# Patient Record
Sex: FEMALE | Race: WHITE | NOT HISPANIC OR LATINO
[De-identification: names, ages, dates, MRNs, and addresses within clinical notes are randomized per-mention and may not be internally consistent; named-entity substitution may affect disease eponyms.]

---

## 2017-12-08 ENCOUNTER — TRANSCRIPTION ENCOUNTER (OUTPATIENT)
Age: 62
End: 2017-12-08

## 2018-04-12 ENCOUNTER — TRANSCRIPTION ENCOUNTER (OUTPATIENT)
Age: 63
End: 2018-04-12

## 2018-11-21 ENCOUNTER — TRANSCRIPTION ENCOUNTER (OUTPATIENT)
Age: 63
End: 2018-11-21

## 2019-01-04 ENCOUNTER — TRANSCRIPTION ENCOUNTER (OUTPATIENT)
Age: 64
End: 2019-01-04

## 2020-09-16 ENCOUNTER — TRANSCRIPTION ENCOUNTER (OUTPATIENT)
Age: 65
End: 2020-09-16

## 2020-11-18 ENCOUNTER — TRANSCRIPTION ENCOUNTER (OUTPATIENT)
Age: 65
End: 2020-11-18

## 2021-03-06 ENCOUNTER — TRANSCRIPTION ENCOUNTER (OUTPATIENT)
Age: 66
End: 2021-03-06

## 2022-04-13 PROBLEM — Z00.00 ENCOUNTER FOR PREVENTIVE HEALTH EXAMINATION: Status: ACTIVE | Noted: 2022-04-13

## 2022-04-14 ENCOUNTER — APPOINTMENT (OUTPATIENT)
Dept: ORTHOPEDIC SURGERY | Facility: CLINIC | Age: 67
End: 2022-04-14
Payer: COMMERCIAL

## 2022-04-14 VITALS
HEIGHT: 64 IN | SYSTOLIC BLOOD PRESSURE: 122 MMHG | DIASTOLIC BLOOD PRESSURE: 74 MMHG | HEART RATE: 96 BPM | BODY MASS INDEX: 30.73 KG/M2 | WEIGHT: 180 LBS

## 2022-04-14 DIAGNOSIS — Z78.9 OTHER SPECIFIED HEALTH STATUS: ICD-10-CM

## 2022-04-14 DIAGNOSIS — Z82.3 FAMILY HISTORY OF STROKE: ICD-10-CM

## 2022-04-14 DIAGNOSIS — Z82.49 FAMILY HISTORY OF ISCHEMIC HEART DISEASE AND OTHER DISEASES OF THE CIRCULATORY SYSTEM: ICD-10-CM

## 2022-04-14 DIAGNOSIS — Z87.39 PERSONAL HISTORY OF OTHER DISEASES OF THE MUSCULOSKELETAL SYSTEM AND CONNECTIVE TISSUE: ICD-10-CM

## 2022-04-14 PROCEDURE — 99204 OFFICE O/P NEW MOD 45 MIN: CPT

## 2022-04-14 NOTE — ADDENDUM
[FreeTextEntry1] : Documented by Theo Raza acting as a scribe for Dr. Yair Aguero on 04/14/2022. All medical record entries made by the Scribe were at my, Dr. Yair Aguero, direction and personally dictated by me on 04/14/2022 . I have reviewed the chart and agree that the record accurately reflects my personal performance of the history, physical exam, assessment and plan. I have also personally directed, reviewed, and agreed with the chart.

## 2022-04-14 NOTE — DISCUSSION/SUMMARY
[de-identified] : We talked about the nature of the condition and treatment options. Anticipatory guidance regarding neurogenic claudication was given. A CT scan has been ordered and is medically necessary due to persistent pain, to assess the size of screws required for surgical intervention, and to assist in the decision of a lumbar laminectomy Vs. lumbar laminectomy and fusion. CT scan will help guide treatment plan, possible surgical intervention vs injection therapy with pain management. The patient will follow up in 4 - 6 weeks for a repeat clinical assessment, at this time we will repeat laminectomy Vs. lumbar laminectomy and fusion.\par \par Prior to appointment and during encounter with patient extensive medical records were reviewed including but not limited to, hospital records, out patient records, imaging results, and lab data. During this appointment the patient was examined, diagnoses were discussed and explained in a face to face manner. In addition extensive time was spent reviewing aforementioned diagnostic studies. Counseling including abnormal image results, differential diagnoses, treatment options, risk and benefits, lifestyle changes, current condition, and current medications was performed. Patient's comments, questions, and concerns were address and patient verbalized understanding. Based on this patient's presentation at our office, which is an orthopedic spine surgeon's office, this patient inherently / intrinsically has a risk, however minute, of developing  issues such as Cauda equina syndrome, bowel and bladder changes, or progression of motor or neurological deficits such as paralysis which may be permanent. \par \par We discussed a lumbar fusion with interbody from L2 to L4-L5 with lumbar laminectomies at these levels. She wishes to discuss an isolated lumbar laminectomy with the hopes of treating her neurogenic claudication more so than her back pain. Anatomic models, Xrays, CT scans/MRI’s were utilized to provide a firm understanding of their surgical plan. Patient is aware that surgery is elective in nature. Risks, benefits, alternatives were discussed and all questions, comments and concerns were encouraged and answered to the patient's satisfaction. The statistical probability of improvement was discussed at length as well as post surgical course. Literature from [employer] spine society was provided to the patient regarding the specific type of surgery as well as a 5 page written surgical consent which the patient will need to sign and return to the office prior to surgical date. Consent forms highlight specific complications related to the complex nature of spinal surgery\par  \par Risks of lumbar surgery include: persistent pain, adjacent segment disease (which will require more surgery in future), dural tears, neurologic injury, and wound healing complications\par  \par Benefits of lumbar surgery include Improved neurologic and pain score\par  \par We also discussed with the patient complications of incisions directly related to obesity, diabetes, previous wound complications or post-surgical wound infections, smoking, neuropathy, and chronic anticoagulation. This risk has been specifically discussed and the patient will discuss modifiable risk factors to be optimized prior to surgical management. A multimodality approach of primary care physician, and medicine subspecialist will be utilized to optimize medical risk factors.\par  \par If patient is a smoker, discontinuation of smoking was advised and must be accomplished 6-8 weeks prior to surgery date. Patient was advised that help with quitting smoking is available through [facility] Smoker's Quit Line and phone number/website was provided, or patient can ask assistance from primary care provider. Elective surgery will not be performed unless patient complies with this policy.\par \par Medical comorbidities including but not limited to diabetes, coronary artery disease, renal insufficiency, uncontrolled hypertension, rheumatoid arthritis, auto-immune disorders, COPD/asthma and/or history of radiation, chemotherapy, being on anticoagulants, chronic steroids, immune modulators, have increased statistical chance of leading to increased hospital stay, protracted recovery period, need for acute or subacute rehabilitation post-operative. There can be increased probability of post-surgical and medical complications including but not limited to surgical site infection, need for revision surgery, deep vein thrombosis, pulmonary embolism, exacerbation of COPD/asthma, pneumonia, UTI, urinary retention, and ileus.

## 2022-04-14 NOTE — HISTORY OF PRESENT ILLNESS
[de-identified] : 66 year old F presents for an initial evaluation of lower back pain, she would like to discuss surgery. She also complains of pain radiating into the RLE. She states she can stand .5 to 1 hours before pain sets in. She requires a stool when prepping food. States lower back and leg pain are equivalent. Alleviating factors include sitting and leaning forward.   [Ataxia] : no ataxia [Incontinence] : no incontinence [Loss of Dexterity] : good dexterity [Urinary Ret.] : no urinary retention

## 2022-04-14 NOTE — PHYSICAL EXAM
[Obese] : obese [Poor Appearance] : well-appearing [Acute Distress] : not in acute distress [de-identified] : CONSTITUTIONAL: The patient is a very pleasant individual who is well-nourished and who appears stated age.\par PSYCHIATRIC: The patient is alert and oriented X 3 and in no apparent distress, and participates with orthopedic evaluation well.\par HEAD: Atraumatic and is nonsyndromic in appearance.\par EENT: No visible thyromegaly, EOMI.\par RESPIRATORY: Respiratory rate is regular, not dyspneic on examination.\par LYMPHATICS: There is no inguinal lymphadenopathy\par INTEGUMENTARY: Skin is clean, dry, and intact about the bilateral lower extremities and lumbar spine.\par VASCULAR: There is brisk capillary refill about the bilateral lower extremities.\par NEUROLOGIC: There are no pathologic reflexes. signs and symptoms of neurogenic claudication. Deep tendon reflexes are well maintained at 2+/4 of the bilateral lower extremities and are symmetric.\par MUSCULOSKELETAL: There is no visible muscular atrophy. Manual motor strength is well maintained in the bilateral lower extremities. Range of motion of lumbar spine is well maintained. Negative tension sign and straight leg raise bilaterally. Quad extension, ankle dorsiflexion, EHL, plantar flexion, and ankle eversion are well preserved. Normal secondary orthopaedic exam of bilateral hips, greater trochanteric area, knees and ankles. No pain with internal or external rotation of the bilateral hips. Pain at the superior aspect of the sacrum.  [de-identified] : AP and Lateral views of the lumbar spine taken 04/14/2022 demonstrates multiple levels of lumbar degenerative disc disease worse at L1-L2. \par \par MRI of the lumbar spine taken at [facility] on 04- demonstrates severe spinal stenosis at L3-L4 and L4-L5. Moderate stenosis at L2, mild to moderate stenosis at L5. \par \par MRI of the lumbar spine taken at [facility] on 12- demonstrates  severe spinal stenosis at L3-L4 and L4-L5. Moderate stenosis at L2, mild to moderate stenosis at L5. \par

## 2022-05-12 ENCOUNTER — APPOINTMENT (OUTPATIENT)
Dept: ORTHOPEDIC SURGERY | Facility: CLINIC | Age: 67
End: 2022-05-12
Payer: MEDICARE

## 2022-05-12 ENCOUNTER — NON-APPOINTMENT (OUTPATIENT)
Age: 67
End: 2022-05-12

## 2022-05-12 VITALS
WEIGHT: 180 LBS | HEIGHT: 65 IN | HEART RATE: 108 BPM | BODY MASS INDEX: 29.99 KG/M2 | SYSTOLIC BLOOD PRESSURE: 126 MMHG | DIASTOLIC BLOOD PRESSURE: 82 MMHG

## 2022-05-12 DIAGNOSIS — M48.062 SPINAL STENOSIS, LUMBAR REGION WITH NEUROGENIC CLAUDICATION: ICD-10-CM

## 2022-05-12 DIAGNOSIS — M47.816 SPONDYLOSIS W/OUT MYELOPATHY OR RADICULOPATHY, LUMBAR REGION: ICD-10-CM

## 2022-05-12 PROCEDURE — 99215 OFFICE O/P EST HI 40 MIN: CPT

## 2022-06-01 DIAGNOSIS — Z01.818 ENCOUNTER FOR OTHER PREPROCEDURAL EXAMINATION: ICD-10-CM

## 2022-06-07 ENCOUNTER — OUTPATIENT (OUTPATIENT)
Dept: OUTPATIENT SERVICES | Facility: HOSPITAL | Age: 67
LOS: 1 days | End: 2022-06-07
Payer: MEDICARE

## 2022-06-07 VITALS
SYSTOLIC BLOOD PRESSURE: 132 MMHG | TEMPERATURE: 97 F | HEIGHT: 63 IN | RESPIRATION RATE: 18 BRPM | WEIGHT: 183.42 LBS | DIASTOLIC BLOOD PRESSURE: 75 MMHG | HEART RATE: 90 BPM | OXYGEN SATURATION: 96 %

## 2022-06-07 DIAGNOSIS — M48.062 SPINAL STENOSIS, LUMBAR REGION WITH NEUROGENIC CLAUDICATION: ICD-10-CM

## 2022-06-07 DIAGNOSIS — Z90.49 ACQUIRED ABSENCE OF OTHER SPECIFIED PARTS OF DIGESTIVE TRACT: Chronic | ICD-10-CM

## 2022-06-07 DIAGNOSIS — Z01.818 ENCOUNTER FOR OTHER PREPROCEDURAL EXAMINATION: ICD-10-CM

## 2022-06-07 DIAGNOSIS — Z98.890 OTHER SPECIFIED POSTPROCEDURAL STATES: Chronic | ICD-10-CM

## 2022-06-07 DIAGNOSIS — Z29.9 ENCOUNTER FOR PROPHYLACTIC MEASURES, UNSPECIFIED: ICD-10-CM

## 2022-06-07 DIAGNOSIS — Z98.891 HISTORY OF UTERINE SCAR FROM PREVIOUS SURGERY: Chronic | ICD-10-CM

## 2022-06-07 LAB
A1C WITH ESTIMATED AVERAGE GLUCOSE RESULT: 5.9 % — HIGH (ref 4–5.6)
ALBUMIN SERPL ELPH-MCNC: 4 G/DL — SIGNIFICANT CHANGE UP (ref 3.3–5.2)
ALP SERPL-CCNC: 96 U/L — SIGNIFICANT CHANGE UP (ref 40–120)
ALT FLD-CCNC: 20 U/L — SIGNIFICANT CHANGE UP
ANION GAP SERPL CALC-SCNC: 9 MMOL/L — SIGNIFICANT CHANGE UP (ref 5–17)
APTT BLD: 30.8 SEC — SIGNIFICANT CHANGE UP (ref 27.5–35.5)
AST SERPL-CCNC: 20 U/L — SIGNIFICANT CHANGE UP
BASOPHILS # BLD AUTO: 0.11 K/UL — SIGNIFICANT CHANGE UP (ref 0–0.2)
BASOPHILS NFR BLD AUTO: 1.5 % — SIGNIFICANT CHANGE UP (ref 0–2)
BILIRUB SERPL-MCNC: <0.2 MG/DL — LOW (ref 0.4–2)
BLD GP AB SCN SERPL QL: SIGNIFICANT CHANGE UP
BUN SERPL-MCNC: 11 MG/DL — SIGNIFICANT CHANGE UP (ref 8–20)
CALCIUM SERPL-MCNC: 9 MG/DL — SIGNIFICANT CHANGE UP (ref 8.6–10.2)
CHLORIDE SERPL-SCNC: 101 MMOL/L — SIGNIFICANT CHANGE UP (ref 98–107)
CO2 SERPL-SCNC: 26 MMOL/L — SIGNIFICANT CHANGE UP (ref 22–29)
CREAT SERPL-MCNC: 0.58 MG/DL — SIGNIFICANT CHANGE UP (ref 0.5–1.3)
EGFR: 100 ML/MIN/1.73M2 — SIGNIFICANT CHANGE UP
EOSINOPHIL # BLD AUTO: 0.14 K/UL — SIGNIFICANT CHANGE UP (ref 0–0.5)
EOSINOPHIL NFR BLD AUTO: 1.9 % — SIGNIFICANT CHANGE UP (ref 0–6)
ESTIMATED AVERAGE GLUCOSE: 123 MG/DL — HIGH (ref 68–114)
GLUCOSE SERPL-MCNC: 163 MG/DL — HIGH (ref 70–99)
HCT VFR BLD CALC: 42.6 % — SIGNIFICANT CHANGE UP (ref 34.5–45)
HGB BLD-MCNC: 13.5 G/DL — SIGNIFICANT CHANGE UP (ref 11.5–15.5)
IMM GRANULOCYTES NFR BLD AUTO: 0.3 % — SIGNIFICANT CHANGE UP (ref 0–1.5)
INR BLD: 1.07 RATIO — SIGNIFICANT CHANGE UP (ref 0.88–1.16)
LYMPHOCYTES # BLD AUTO: 1.13 K/UL — SIGNIFICANT CHANGE UP (ref 1–3.3)
LYMPHOCYTES # BLD AUTO: 15 % — SIGNIFICANT CHANGE UP (ref 13–44)
MCHC RBC-ENTMCNC: 31.5 PG — SIGNIFICANT CHANGE UP (ref 27–34)
MCHC RBC-ENTMCNC: 31.7 GM/DL — LOW (ref 32–36)
MCV RBC AUTO: 99.5 FL — SIGNIFICANT CHANGE UP (ref 80–100)
MONOCYTES # BLD AUTO: 0.64 K/UL — SIGNIFICANT CHANGE UP (ref 0–0.9)
MONOCYTES NFR BLD AUTO: 8.5 % — SIGNIFICANT CHANGE UP (ref 2–14)
MRSA PCR RESULT.: SIGNIFICANT CHANGE UP
NEUTROPHILS # BLD AUTO: 5.47 K/UL — SIGNIFICANT CHANGE UP (ref 1.8–7.4)
NEUTROPHILS NFR BLD AUTO: 72.8 % — SIGNIFICANT CHANGE UP (ref 43–77)
PLATELET # BLD AUTO: 284 K/UL — SIGNIFICANT CHANGE UP (ref 150–400)
POTASSIUM SERPL-MCNC: 4.1 MMOL/L — SIGNIFICANT CHANGE UP (ref 3.5–5.3)
POTASSIUM SERPL-SCNC: 4.1 MMOL/L — SIGNIFICANT CHANGE UP (ref 3.5–5.3)
PROT SERPL-MCNC: 6.7 G/DL — SIGNIFICANT CHANGE UP (ref 6.6–8.7)
PROTHROM AB SERPL-ACNC: 12.4 SEC — SIGNIFICANT CHANGE UP (ref 10.5–13.4)
RBC # BLD: 4.28 M/UL — SIGNIFICANT CHANGE UP (ref 3.8–5.2)
RBC # FLD: 13.3 % — SIGNIFICANT CHANGE UP (ref 10.3–14.5)
S AUREUS DNA NOSE QL NAA+PROBE: SIGNIFICANT CHANGE UP
SODIUM SERPL-SCNC: 136 MMOL/L — SIGNIFICANT CHANGE UP (ref 135–145)
WBC # BLD: 7.51 K/UL — SIGNIFICANT CHANGE UP (ref 3.8–10.5)
WBC # FLD AUTO: 7.51 K/UL — SIGNIFICANT CHANGE UP (ref 3.8–10.5)

## 2022-06-07 PROCEDURE — 71046 X-RAY EXAM CHEST 2 VIEWS: CPT | Mod: 26

## 2022-06-07 PROCEDURE — 71046 X-RAY EXAM CHEST 2 VIEWS: CPT

## 2022-06-07 PROCEDURE — G0463: CPT

## 2022-06-07 NOTE — H&P PST ADULT - NSICDXPASTMEDICALHX_GEN_ALL_CORE_FT
PAST MEDICAL HISTORY:  Spinal stenosis, lumbar region, with neurogenic claudication     Spondylosis without myelopathy or radiculopathy, lumbar region      PAST MEDICAL HISTORY:  Hyperlipidemia     Spinal stenosis, lumbar region, with neurogenic claudication     Spondylosis without myelopathy or radiculopathy, lumbar region

## 2022-06-07 NOTE — H&P PST ADULT - NSANTHOSAYNRD_GEN_A_CORE
No. THEO screening performed.  STOP BANG Legend: 0-2 = LOW Risk; 3-4 = INTERMEDIATE Risk; 5-8 = HIGH Risk

## 2022-06-07 NOTE — H&P PST ADULT - ATTENDING COMMENTS
Attending statement:  I have personally seen this patient, and formed a face to face diagnostic evaluation on this patient on this date.  I have reviewed the PA, NP and or Medical/PA student and/or Resident documentation and agree with the history, physical exam and plan of care except if noted otherwise.      Patient presents for a lumbar laminectomy and instrumented fusion we have discussed the case with her in detail on Sarah 10 patient wishes to undergo sort of a bimodal surgery for back pain and neurogenic claudication that will include a fusion from L2-L5 including interbody's as well as a lumbar laminectomy at primarily L3-4 and 4 5.  Patient is aware of incomplete resolution of signs and symptoms persistent back pain adjacent segment disease revision surgery etc.

## 2022-06-07 NOTE — H&P PST ADULT - HISTORY OF PRESENT ILLNESS
Pt 67 years-old female seen today pre-op for Lumbar fusion L2-L5, Interbody fusion, L3-L4, L4-L5 LAMINECTOMY l2-L5. Pt with PMH of HLD, Lumbar pain, Pt report longstanding lumbar pain for more than 2 years, lumbar pain has progressively worsening within the last 9 months. Pt denies any trauma to site, report pain radiating into the RLE. Pain aggravated by ambulation, standing or sitting for a long period of time, alleviating factors include leaning forward, short period of sitting down, pain medication aleve and tramadol, report intermittent and tingling of lumbar region. Pt denies ataxia, no incontinence, good dexterity and no urinary retention. Pt sen today for a scheduled surgery on 6/13/2022 with Dr. Aguero  Pt 67 years-old female seen today pre-op for Lumbar fusion L2-L5, Interbody fusion, L3-L4, L4-L5 LAMINECTOMY l2-L5. Pt with PMH of HLD, Lumbar stenosis and pain, Polymyalgia Rheumatica, Pt report longstanding lumbar pain for more than 2 years, lumbar pain has progressively worsening within the last 9 months. Pt denies any trauma to site, report pain radiating into the RLE. Pain aggravated by ambulation, standing or sitting for a long period of time, alleviating factors include leaning forward, short period of sitting down, pain medication aleve and tramadol, report occasional tingling and numbness of lumbar region. Pt denies ataxia, no incontinence, good dexterity, no urinary retention, no fever/chills. Pt sen today for a scheduled surgery on 6/13/2022 with Dr. Aguero.

## 2022-06-07 NOTE — H&P PST ADULT - NSICDXFAMILYHX_GEN_ALL_CORE_FT
FAMILY HISTORY:  Father  Still living? No  Family history of stroke, Age at diagnosis: Age Unknown    Mother  Still living? No  FHx: heart disease, Age at diagnosis: Age Unknown    Sibling  Still living? Yes, Estimated age: Age Unknown  FH: diabetes mellitus, Age at diagnosis: Age Unknown

## 2022-06-07 NOTE — CHART NOTE - NSCHARTNOTEFT_GEN_A_CORE
Search Terms: Ernst Martinez, 1955     Search Date: 06/07/2022 19:56:07 PM       The Drug Utilization Report below displays all of the controlled substance prescriptions, if any, that your patient has filled in the last twelve months. The information displayed on this report is compiled from pharmacy submissions to the Department, and accurately reflects the information as submitted by the pharmacies.    | Reference #: [id number]       Patient Name: Ernst Martinez, Female     YOB: 1955       Address: [address] [place] [address]       05/03/2022 05/03/2022 phentermine 15 mg capsule  21 21 Conner Castro MD [id number] [facility] [id number]   03/24/2022 03/25/2022 tramadol hcl 50 mg tablet  60 15 Emma Betancourt [id number] [facility] [id number]   12/15/2021 03/10/2022 green chewable 5.0mg/5.0mg cbd chewable tablet (lemon)  1 3 Trini Toro M , [id number] [facility]   12/15/2021 03/10/2022 well-being (1:4) 1 mgthc and 4 mgcbd/0.5 ml tincture  1 3 Trini Toro M , [id number] [facility]   03/08/2022 03/10/2022 tramadol hcl 50 mg tablet  60 15 Emma Betancourt [id number] [facility] [id number]   12/16/2021 01/07/2022 well-being (1:4) 1 mgthc and 4 mgcbd/0.5 ml tincture  1 5 Trini Toro M , [id number] [facility]   12/16/2021 01/07/2022 green chewable 5.0mg/5.0mg cbd chewable tablet (lemon)  1 5 Trini Toro M , [id number] [facility]   12/16/2021 12/17/2021 well-being (1:4) 1 mgthc and 4 mgcbd/0.5 ml tincture  1 5 Trini Toro M , [id number] [facility]   12/16/2021 12/17/2021 green chewable 5.0mg/5.0mg cbd chewable tablet (lemon)  1 5 Trini Toro M , [id number] [facility]   09/23/2021 09/23/2021 tramadol hcl 50 mg tablet  21 5 John Paul Diamond [id number] [facility] [id number]

## 2022-06-07 NOTE — H&P PST ADULT - ASSESSMENT
Pt 67 years-old female seen today pre-op for Lumbar fusion L2-L5, Interbody fusion, L3-L4, L4-L5 LAMINECTOMY l2-L5. Pt with PMH of HLD, Lumbar stenosis and pain, Polymyalgia Rheumatica, Pt report longstanding lumbar pain for more than 2 years, lumbar pain has progressively worsening within the last 9 months. Pt denies any trauma to site, report pain radiating into the RLE. Pain aggravated by ambulation, standing or sitting for a long period of time, alleviating factors include leaning forward, short period of sitting down, pain medication aleve and tramadol, report occasional tingling and numbness of lumbar region. Pt denies ataxia, no incontinence, good dexterity, no urinary retention, no fever/chills. Pt sen today for a scheduled surgery on 2022 with Dr. Aguero. Surgery protocol reviewed with Pt today, medical clearance received from PCP office today, Pt instructed on COVID test requirement prior to this procedure   CAPRINI VTE 2.0 SCORE [CLOT updated 2019]    AGE RELATED RISK FACTORS                                                       MOBILITY RELATED FACTORS  [ ] Age 41-60 years                                            (1 Point)                    [ ] Bed rest                                                        (1 Point)  [x ] Age: 61-74 years                                           (2 Points)                  [ ] Plaster cast                                                   (2 Points)  [ ] Age= 75 years                                              (3 Points)                    [ ] Bed bound for more than 72 hours                 (2 Points)    DISEASE RELATED RISK FACTORS                                               GENDER SPECIFIC FACTORS  [ ] Edema in the lower extremities                       (1 Point)              [ ] Pregnancy                                                     (1 Point)  [ ] Varicose veins                                               (1 Point)                     [ ] Post-partum < 6 weeks                                   (1 Point)             [x] BMI > 25 Kg/m2                                            (1 Point)                     [ ] Hormonal therapy  or oral contraception          (1 Point)                 [ ] Sepsis (in the previous month)                        (1 Point)               [ ] History of pregnancy complications                 (1 point)  [ ] Pneumonia or serious lung disease                                               [ ] Unexplained or recurrent                     (1 Point)           (in the previous month)                               (1 Point)  [ ] Abnormal pulmonary function test                     (1 Point)                 SURGERY RELATED RISK FACTORS  [ ] Acute myocardial infarction                              (1 Point)               [ ]  Section                                             (1 Point)  [ ] Congestive heart failure (in the previous month)  (1 Point)      [ ] Minor surgery                                                  (1 Point)   [ ] Inflammatory bowel disease                             (1 Point)               [ ] Arthroscopic surgery                                        (2 Points)  [ ] Central venous access                                      (2 Points)                [x] General surgery lasting more than 45 minutes (2 points)  [ ] Malignancy- Present or previous                   (2 Points)                [ ] Elective arthroplasty                                         (5 points)    [ ] Stroke (in the previous month)                          (5 Points)                                                                                                                                                           HEMATOLOGY RELATED FACTORS                                                 TRAUMA RELATED RISK FACTORS  [ ] Prior episodes of VTE                                     (3 Points)                [ ] Fracture of the hip, pelvis, or leg                       (5 Points)  [ ] Positive family history for VTE                         (3 Points)             [ ] Acute spinal cord injury (in the previous month)  (5 Points)  [ ] Prothrombin 45881 A                                     (3 Points)               [ ] Paralysis  (less than 1 month)                             (5 Points)  [ ] Factor V Leiden                                             (3 Points)                  [ ] Multiple Trauma within 1 month                        (5 Points)  [ ] Lupus anticoagulants                                     (3 Points)                                                           [ ] Anticardiolipin antibodies                               (3 Points)                                                       [ ] High homocysteine in the blood                      (3 Points)                                             [ ] Other congenital or acquired thrombophilia      (3 Points)                                                [ ] Heparin induced thrombocytopenia                  (3 Points)                                     Total Score [    5      ]  OPIOID RISK TOOL    KASSANDRA EACH BOX THAT APPLIES AND ADD TOTALS AT THE END    FAMILY HISTORY OF SUBSTANCE ABUSE                 FEMALE         MALE                                                Alcohol                             [  ]1 pt          [  ]3pts                                               Illegal Durgs                     [  ]2 pts        [  ]3pts                                               Rx Drugs                           [  ]4 pts        [  ]4 pts    PERSONAL HISTORY OF SUBSTANCE ABUSE                                                                                          Alcohol                             [  ]3 pts       [  ]3 pts                                               Illegal Drugs                     [  ]4 pts        [  ]4 pts                                               Rx Drugs                           [  ]5 pts        [  ]5 pts    AGE BETWEEN 16-45 YEARS                                      [  ]1 pt         [  ]1 pt    HISTORY OF PREADOLESCENT   SEXUAL ABUSE                                                             [  ]3 pts        [  ]0pts    PSYCHOLOGICAL DISEASE                     ADD, OCD, Bipolar, Schizophrenia        [  ]2 pts         [  ]2 pts                      Depression                                               [  ]1 pt           [  ]1 pt           SCORING TOTAL   (add numbers and type here)              (0**)                                     A score of 3 or lower indicated LOW risk for future opioid abuse  A score of 4 to 7 indicated moderate risk for future opioid abuse  A score of 8 or higher indicates a high risk for opioid abuse

## 2022-06-10 LAB — SARS-COV-2 N GENE NPH QL NAA+PROBE: NOT DETECTED

## 2022-06-10 NOTE — PHYSICAL EXAM
[Obese] : obese [Poor Appearance] : well-appearing [Acute Distress] : not in acute distress [de-identified] : CONSTITUTIONAL: The patient is a very pleasant individual who is well-nourished and who appears stated age.\par PSYCHIATRIC: The patient is alert and oriented X 3 and in no apparent distress, and participates with orthopedic evaluation well.\par HEAD: Atraumatic and is nonsyndromic in appearance.\par EENT: No visible thyromegaly, EOMI.\par RESPIRATORY: Respiratory rate is regular, not dyspneic on examination.\par LYMPHATICS: There is no inguinal lymphadenopathy\par INTEGUMENTARY: Skin is clean, dry, and intact about the bilateral lower extremities and lumbar spine.\par VASCULAR: There is brisk capillary refill about the bilateral lower extremities.\par NEUROLOGIC: There are no pathologic reflexes. signs and symptoms of neurogenic claudication. Deep tendon reflexes are well maintained at 2+/4 of the bilateral lower extremities and are symmetric.\par MUSCULOSKELETAL: There is no visible muscular atrophy. Manual motor strength is well maintained in the bilateral lower extremities. Range of motion of lumbar spine is well maintained. Negative tension sign and straight leg raise bilaterally. Quad extension, ankle dorsiflexion, EHL, plantar flexion, and ankle eversion are well preserved. Normal secondary orthopaedic exam of bilateral hips, greater trochanteric area, knees and ankles. No pain with internal or external rotation of the bilateral hips. Pain at the superior aspect of the sacrum.  [de-identified] : CT scan of the lumbar spine taken at [facility] on 05- demonstrates multiple levels of lumbar degenerative disc disease, L2-L3 is bone on bone, L3-L4 and L4-L5 moderate to severe lumbar degenerative disc disease. \par \par AP and Lateral views of the lumbar spine taken 04/14/2022 demonstrates multiple levels of lumbar degenerative disc disease worse at L1-L2. \par \par MRI of the lumbar spine taken at [facility] on 04- demonstrates severe spinal stenosis at L3-L4 and L4-L5. Moderate stenosis at L2, mild to moderate stenosis at L5. \par \par MRI of the lumbar spine taken at [facility] on 12- demonstrates severe spinal stenosis at L3-L4 and L4-L5. Moderate stenosis at L2, mild to moderate stenosis at L5. \par

## 2022-06-10 NOTE — PATIENT PROFILE ADULT - FALL HARM RISK - HARM RISK INTERVENTIONS
Assistance with ambulation/Assistance OOB with selected safe patient handling equipment/Communicate Risk of Fall with Harm to all staff/Discuss with provider need for PT consult/Monitor gait and stability/Provide patient with walking aids - walker, cane, crutches/Reinforce activity limits and safety measures with patient and family/Review medications for side effects contributing to fall risk/Sit up slowly, dangle for a short time, stand at bedside before walking/Tailored Fall Risk Interventions/Toileting schedule using arm’s reach rule for commode and bathroom/Use of alarms - bed, chair and/or voice tab/Visual Cue: Yellow wristband and red socks/Bed in lowest position, wheels locked, appropriate side rails in place/Call bell, personal items and telephone in reach/Instruct patient to call for assistance before getting out of bed or chair/Non-slip footwear when patient is out of bed/[place] to call system/Physically safe environment - no spills, clutter or unnecessary equipment/Purposeful Proactive Rounding/Room/bathroom lighting operational, light cord in reach

## 2022-06-10 NOTE — DISCUSSION/SUMMARY
Mom is requesting a refill of albuterol; LM for mom to return call.   [de-identified] : We talked about the nature of the condition and treatment options. Anticipatory guidance regarding neurogenic claudication was given. \par \par Prior to appointment and during encounter with patient extensive medical records were reviewed including but not limited to, hospital records, out patient records, imaging results, and lab data. During this appointment the patient was examined, diagnoses were discussed and explained in a face to face manner. In addition extensive time was spent reviewing aforementioned diagnostic studies. Counseling including abnormal image results, differential diagnoses, treatment options, risk and benefits, lifestyle changes, current condition, and current medications was performed. Patient's comments, questions, and concerns were address and patient verbalized understanding. Based on this patient's presentation at our office, which is an orthopedic spine surgeon's office, this patient inherently / intrinsically has a risk, however minute, of developing  issues such as Cauda equina syndrome, bowel and bladder changes, or progression of motor or neurological deficits such as paralysis which may be permanent. \par \par We discussed a lumbar fusion with from L2 to L4-L5 with lumbar laminectomies at these levels, interbody fusion at L3-L4 and L4-L5. She wishes to discuss an isolated lumbar laminectomy with the hopes of treating her neurogenic claudication more so than her back pain. Anatomic models, Xrays, CT scans/MRI’s were utilized to provide a firm understanding of their surgical plan. Patient is aware that surgery is elective in nature. Risks, benefits, alternatives were discussed and all questions, comments and concerns were encouraged and answered to the patient's satisfaction. The statistical probability of improvement was discussed at length as well as post surgical course. Literature from [employer] society was provided to the patient regarding the specific type of surgery as well as a 5 page written surgical consent which the patient will need to sign and return to the office prior to surgical date. Consent forms highlight specific complications related to the complex nature of spinal surgery\par  \par Risks of lumbar surgery include: persistent pain, adjacent segment disease (which will require more surgery in future), dural tears, neurologic injury, and wound healing complications\par  \par Benefits of lumbar surgery include Improved neurologic and pain score. I had a very detailed conversation with this patient via phone on Sarah 10, 2022 she states her mechanically orientated low back pain at this point is equal to her neurogenic claudication therefore I recommended a multilevel interbody fusion at [id number] with pedicle screws ranging from L2-L5.  Patient is on board with this plan patient is aware that this is a much larger procedure than an lumbar laminectomy but I have clearly discussed with an isolated lumbar laminectomy is not going to address her mechanically orientated low back pain.\par  \par We also discussed with the patient complications of incisions directly related to obesity, diabetes, previous wound complications or post-surgical wound infections, smoking, neuropathy, and chronic anticoagulation. This risk has been specifically discussed and the patient will discuss modifiable risk factors to be optimized prior to surgical management. A multimodality approach of primary care physician, and medicine subspecialist will be utilized to optimize medical risk factors.\par  \par If patient is a smoker, discontinuation of smoking was advised and must be accomplished 6-8 weeks prior to surgery date. Patient was advised that help with quitting smoking is available through [employer] ([employer]) Smoker's Quit Line and phone number/website was provided, or patient can ask assistance from primary care provider. Elective surgery will not be performed unless patient complies with this policy.\par \par Medical comorbidities including but not limited to diabetes, coronary artery disease, renal insufficiency, uncontrolled hypertension, rheumatoid arthritis, auto-immune disorders, COPD/asthma and/or history of radiation, chemotherapy, being on anticoagulants, chronic steroids, immune modulators, have increased statistical chance of leading to increased hospital stay, protracted recovery period, need for acute or subacute rehabilitation post-operative. There can be increased probability of post-surgical and medical complications including but not limited to surgical site infection, need for revision surgery, deep vein thrombosis, pulmonary embolism, exacerbation of COPD/asthma, pneumonia, UTI, urinary retention, and ileus.

## 2022-06-10 NOTE — ADDENDUM
[FreeTextEntry1] : Documented by Theo Raza acting as a scribe for Dr. Yair Aguero on 05/12/2022. All medical record entries made by the Scribe were at my, Dr. Yair Aguero, direction and personally dictated by me on 05/12/2022 . I have reviewed the chart and agree that the record accurately reflects my personal performance of the history, physical exam, assessment and plan. I have also personally directed, reviewed, and agreed with the chart.

## 2022-06-10 NOTE — HISTORY OF PRESENT ILLNESS
[de-identified] : 66 year old F presents for interpretation of recent Ct results. She continues to complain of lower back pain and she would like to discuss surgery. She also complains of pain radiating into the RLE. She states she can stand .5 to 1 hours before pain sets in. She requires a stool when prepping food. States lower back and leg pain are equivalent. Alleviating factors include sitting and leaning forward.   [Ataxia] : no ataxia [Incontinence] : no incontinence [Loss of Dexterity] : good dexterity [Urinary Ret.] : no urinary retention

## 2022-06-12 ENCOUNTER — TRANSCRIPTION ENCOUNTER (OUTPATIENT)
Age: 67
End: 2022-06-12

## 2022-06-13 ENCOUNTER — APPOINTMENT (OUTPATIENT)
Dept: ORTHOPEDIC SURGERY | Facility: HOSPITAL | Age: 67
End: 2022-06-13

## 2022-06-13 ENCOUNTER — INPATIENT (INPATIENT)
Facility: HOSPITAL | Age: 67
LOS: 2 days | Discharge: HOME | DRG: 454 | End: 2022-06-16
Attending: ORTHOPAEDIC SURGERY | Admitting: ORTHOPAEDIC SURGERY
Payer: MEDICARE

## 2022-06-13 ENCOUNTER — TRANSCRIPTION ENCOUNTER (OUTPATIENT)
Age: 67
End: 2022-06-13

## 2022-06-13 VITALS
SYSTOLIC BLOOD PRESSURE: 125 MMHG | OXYGEN SATURATION: 98 % | HEIGHT: 62.99 IN | TEMPERATURE: 98 F | WEIGHT: 183.42 LBS | HEART RATE: 82 BPM | DIASTOLIC BLOOD PRESSURE: 65 MMHG | RESPIRATION RATE: 16 BRPM

## 2022-06-13 DIAGNOSIS — M47.816 SPONDYLOSIS WITHOUT MYELOPATHY OR RADICULOPATHY, LUMBAR REGION: ICD-10-CM

## 2022-06-13 DIAGNOSIS — Z90.49 ACQUIRED ABSENCE OF OTHER SPECIFIED PARTS OF DIGESTIVE TRACT: Chronic | ICD-10-CM

## 2022-06-13 DIAGNOSIS — Z98.890 OTHER SPECIFIED POSTPROCEDURAL STATES: Chronic | ICD-10-CM

## 2022-06-13 DIAGNOSIS — Z98.891 HISTORY OF UTERINE SCAR FROM PREVIOUS SURGERY: Chronic | ICD-10-CM

## 2022-06-13 PROBLEM — M48.062 SPINAL STENOSIS, LUMBAR REGION WITH NEUROGENIC CLAUDICATION: Chronic | Status: ACTIVE | Noted: 2022-06-07

## 2022-06-13 PROBLEM — E78.5 HYPERLIPIDEMIA, UNSPECIFIED: Chronic | Status: ACTIVE | Noted: 2022-06-07

## 2022-06-13 LAB
GLUCOSE BLDC GLUCOMTR-MCNC: 115 MG/DL — HIGH (ref 70–99)
GLUCOSE BLDC GLUCOMTR-MCNC: 174 MG/DL — HIGH (ref 70–99)
GLUCOSE BLDC GLUCOMTR-MCNC: 195 MG/DL — HIGH (ref 70–99)
GLUCOSE BLDC GLUCOMTR-MCNC: 96 MG/DL — SIGNIFICANT CHANGE UP (ref 70–99)
HCT VFR BLD CALC: 36.9 % — SIGNIFICANT CHANGE UP (ref 34.5–45)
HGB BLD-MCNC: 11.9 G/DL — SIGNIFICANT CHANGE UP (ref 11.5–15.5)
MCHC RBC-ENTMCNC: 31.4 PG — SIGNIFICANT CHANGE UP (ref 27–34)
MCHC RBC-ENTMCNC: 32.2 GM/DL — SIGNIFICANT CHANGE UP (ref 32–36)
MCV RBC AUTO: 97.4 FL — SIGNIFICANT CHANGE UP (ref 80–100)
PLATELET # BLD AUTO: 250 K/UL — SIGNIFICANT CHANGE UP (ref 150–400)
RBC # BLD: 3.79 M/UL — LOW (ref 3.8–5.2)
RBC # FLD: 13.2 % — SIGNIFICANT CHANGE UP (ref 10.3–14.5)
WBC # BLD: 14.17 K/UL — HIGH (ref 3.8–10.5)
WBC # FLD AUTO: 14.17 K/UL — HIGH (ref 3.8–10.5)

## 2022-06-13 PROCEDURE — 22853 INSJ BIOMECHANICAL DEVICE: CPT

## 2022-06-13 PROCEDURE — 22634 ARTHRD CMBN 1NTRSPC EA ADDL: CPT | Mod: AS

## 2022-06-13 PROCEDURE — 63053 LAM FACTC/FRMT ARTHRD LUM EA: CPT

## 2022-06-13 PROCEDURE — 22634 ARTHRD CMBN 1NTRSPC EA ADDL: CPT

## 2022-06-13 PROCEDURE — 22633 ARTHRD CMBN 1NTRSPC LUMBAR: CPT

## 2022-06-13 PROCEDURE — 63052 LAM FACETC/FRMT ARTHRD LUM 1: CPT

## 2022-06-13 PROCEDURE — 22853 INSJ BIOMECHANICAL DEVICE: CPT | Mod: AS

## 2022-06-13 PROCEDURE — 22842 INSERT SPINE FIXATION DEVICE: CPT

## 2022-06-13 PROCEDURE — 22842 INSERT SPINE FIXATION DEVICE: CPT | Mod: AS

## 2022-06-13 PROCEDURE — 22633 ARTHRD CMBN 1NTRSPC LUMBAR: CPT | Mod: AS

## 2022-06-13 DEVICE — ALLOGRAFT TRINITY ELITE LARGE: Type: IMPLANTABLE DEVICE | Status: FUNCTIONAL

## 2022-06-13 DEVICE — SEALANT FLOSEAL FAST PREP HEMOSTATIC MATRIX 10ML: Type: IMPLANTABLE DEVICE | Status: FUNCTIONAL

## 2022-06-13 DEVICE — SPACER PROLIFT EXPAND POST 15 DEG 10X28X8-13MM: Type: IMPLANTABLE DEVICE | Status: FUNCTIONAL

## 2022-06-13 DEVICE — SCREW POLY 6.5X50MM: Type: IMPLANTABLE DEVICE | Status: FUNCTIONAL

## 2022-06-13 DEVICE — SCREW SERRATO POLY 6.5X40MM: Type: IMPLANTABLE DEVICE | Status: FUNCTIONAL

## 2022-06-13 DEVICE — GRAFT VITOSIS BIMODAL 10CC: Type: IMPLANTABLE DEVICE | Status: FUNCTIONAL

## 2022-06-13 DEVICE — CHIP CANCELLOUS 1-8MM 30CC: Type: IMPLANTABLE DEVICE | Status: FUNCTIONAL

## 2022-06-13 DEVICE — OSTEOSELECT DBM PLUS 5CC: Type: IMPLANTABLE DEVICE | Status: FUNCTIONAL

## 2022-06-13 DEVICE — SPACER PROLIFT EXPAND POST 12 DEG 10X28X8-13MM: Type: IMPLANTABLE DEVICE | Status: FUNCTIONAL

## 2022-06-13 DEVICE — SCREW SERRATO POLY 7.5X40MM: Type: IMPLANTABLE DEVICE | Status: FUNCTIONAL

## 2022-06-13 DEVICE — SCREW BLOCKER BONE XIA: Type: IMPLANTABLE DEVICE | Status: FUNCTIONAL

## 2022-06-13 DEVICE — SCREW SERRATO 6.5X45MM: Type: IMPLANTABLE DEVICE | Status: FUNCTIONAL

## 2022-06-13 DEVICE — SYS DELIVERY LITE BIO STRL W/ CANNULA: Type: IMPLANTABLE DEVICE | Status: FUNCTIONAL

## 2022-06-13 DEVICE — ROD 90MM: Type: IMPLANTABLE DEVICE | Status: FUNCTIONAL

## 2022-06-13 RX ORDER — ONDANSETRON 8 MG/1
4 TABLET, FILM COATED ORAL EVERY 6 HOURS
Refills: 0 | Status: DISCONTINUED | OUTPATIENT
Start: 2022-06-13 | End: 2022-06-16

## 2022-06-13 RX ORDER — SODIUM CHLORIDE 9 MG/ML
1000 INJECTION, SOLUTION INTRAVENOUS
Refills: 0 | Status: DISCONTINUED | OUTPATIENT
Start: 2022-06-13 | End: 2022-06-13

## 2022-06-13 RX ORDER — ACETAMINOPHEN 500 MG
975 TABLET ORAL EVERY 8 HOURS
Refills: 0 | Status: DISCONTINUED | OUTPATIENT
Start: 2022-06-13 | End: 2022-06-16

## 2022-06-13 RX ORDER — HYDROMORPHONE HYDROCHLORIDE 2 MG/ML
30 INJECTION INTRAMUSCULAR; INTRAVENOUS; SUBCUTANEOUS
Refills: 0 | Status: DISCONTINUED | OUTPATIENT
Start: 2022-06-13 | End: 2022-06-14

## 2022-06-13 RX ORDER — ATORVASTATIN CALCIUM 80 MG/1
80 TABLET, FILM COATED ORAL AT BEDTIME
Refills: 0 | Status: DISCONTINUED | OUTPATIENT
Start: 2022-06-13 | End: 2022-06-16

## 2022-06-13 RX ORDER — ONDANSETRON 8 MG/1
4 TABLET, FILM COATED ORAL EVERY 6 HOURS
Refills: 0 | Status: DISCONTINUED | OUTPATIENT
Start: 2022-06-13 | End: 2022-06-13

## 2022-06-13 RX ORDER — CEFAZOLIN SODIUM 1 G
2000 VIAL (EA) INJECTION
Refills: 0 | Status: DISCONTINUED | OUTPATIENT
Start: 2022-06-13 | End: 2022-06-13

## 2022-06-13 RX ORDER — SODIUM CHLORIDE 9 MG/ML
1000 INJECTION, SOLUTION INTRAVENOUS
Refills: 0 | Status: DISCONTINUED | OUTPATIENT
Start: 2022-06-13 | End: 2022-06-16

## 2022-06-13 RX ORDER — CELECOXIB 200 MG/1
200 CAPSULE ORAL EVERY 12 HOURS
Refills: 0 | Status: DISCONTINUED | OUTPATIENT
Start: 2022-06-13 | End: 2022-06-16

## 2022-06-13 RX ORDER — ASPIRIN/CALCIUM CARB/MAGNESIUM 324 MG
325 TABLET ORAL DAILY
Refills: 0 | Status: CANCELLED | OUTPATIENT
Start: 2022-06-14 | End: 2022-06-13

## 2022-06-13 RX ORDER — HYDROMORPHONE HYDROCHLORIDE 2 MG/ML
0.5 INJECTION INTRAMUSCULAR; INTRAVENOUS; SUBCUTANEOUS
Refills: 0 | Status: DISCONTINUED | OUTPATIENT
Start: 2022-06-13 | End: 2022-06-13

## 2022-06-13 RX ORDER — CEFAZOLIN SODIUM 1 G
2000 VIAL (EA) INJECTION
Refills: 0 | Status: COMPLETED | OUTPATIENT
Start: 2022-06-13 | End: 2022-06-14

## 2022-06-13 RX ORDER — DEXTROSE 50 % IN WATER 50 %
25 SYRINGE (ML) INTRAVENOUS ONCE
Refills: 0 | Status: DISCONTINUED | OUTPATIENT
Start: 2022-06-13 | End: 2022-06-16

## 2022-06-13 RX ORDER — ASPIRIN/CALCIUM CARB/MAGNESIUM 324 MG
325 TABLET ORAL DAILY
Refills: 0 | Status: DISCONTINUED | OUTPATIENT
Start: 2022-06-14 | End: 2022-06-16

## 2022-06-13 RX ORDER — DEXTROSE 50 % IN WATER 50 %
12.5 SYRINGE (ML) INTRAVENOUS ONCE
Refills: 0 | Status: DISCONTINUED | OUTPATIENT
Start: 2022-06-13 | End: 2022-06-16

## 2022-06-13 RX ORDER — METHOCARBAMOL 500 MG/1
750 TABLET, FILM COATED ORAL EVERY 8 HOURS
Refills: 0 | Status: DISCONTINUED | OUTPATIENT
Start: 2022-06-13 | End: 2022-06-16

## 2022-06-13 RX ORDER — MAGNESIUM HYDROXIDE 400 MG/1
30 TABLET, CHEWABLE ORAL EVERY 12 HOURS
Refills: 0 | Status: DISCONTINUED | OUTPATIENT
Start: 2022-06-13 | End: 2022-06-16

## 2022-06-13 RX ORDER — TRANEXAMIC ACID 100 MG/ML
1000 INJECTION, SOLUTION INTRAVENOUS ONCE
Refills: 0 | Status: DISCONTINUED | OUTPATIENT
Start: 2022-06-13 | End: 2022-06-13

## 2022-06-13 RX ORDER — PANTOPRAZOLE SODIUM 20 MG/1
40 TABLET, DELAYED RELEASE ORAL
Refills: 0 | Status: DISCONTINUED | OUTPATIENT
Start: 2022-06-13 | End: 2022-06-16

## 2022-06-13 RX ORDER — DEXTROSE 50 % IN WATER 50 %
15 SYRINGE (ML) INTRAVENOUS ONCE
Refills: 0 | Status: DISCONTINUED | OUTPATIENT
Start: 2022-06-13 | End: 2022-06-16

## 2022-06-13 RX ORDER — BUPIVACAINE 13.3 MG/ML
20 INJECTION, SUSPENSION, LIPOSOMAL INFILTRATION ONCE
Refills: 0 | Status: DISCONTINUED | OUTPATIENT
Start: 2022-06-13 | End: 2022-06-13

## 2022-06-13 RX ORDER — INFLUENZA VIRUS VACCINE 15; 15; 15; 15 UG/.5ML; UG/.5ML; UG/.5ML; UG/.5ML
0.7 SUSPENSION INTRAMUSCULAR ONCE
Refills: 0 | Status: DISCONTINUED | OUTPATIENT
Start: 2022-06-13 | End: 2022-06-13

## 2022-06-13 RX ORDER — CEFAZOLIN SODIUM 1 G
2000 VIAL (EA) INJECTION ONCE
Refills: 0 | Status: COMPLETED | OUTPATIENT
Start: 2022-06-13 | End: 2022-06-13

## 2022-06-13 RX ORDER — DULOXETINE HYDROCHLORIDE 30 MG/1
60 CAPSULE, DELAYED RELEASE ORAL DAILY
Refills: 0 | Status: DISCONTINUED | OUTPATIENT
Start: 2022-06-13 | End: 2022-06-16

## 2022-06-13 RX ORDER — FENTANYL CITRATE 50 UG/ML
25 INJECTION INTRAVENOUS
Refills: 0 | Status: DISCONTINUED | OUTPATIENT
Start: 2022-06-13 | End: 2022-06-13

## 2022-06-13 RX ORDER — GLUCAGON INJECTION, SOLUTION 0.5 MG/.1ML
1 INJECTION, SOLUTION SUBCUTANEOUS ONCE
Refills: 0 | Status: DISCONTINUED | OUTPATIENT
Start: 2022-06-13 | End: 2022-06-16

## 2022-06-13 RX ORDER — NALOXONE HYDROCHLORIDE 4 MG/.1ML
0.1 SPRAY NASAL
Refills: 0 | Status: DISCONTINUED | OUTPATIENT
Start: 2022-06-13 | End: 2022-06-16

## 2022-06-13 RX ORDER — SODIUM CHLORIDE 9 MG/ML
3 INJECTION INTRAMUSCULAR; INTRAVENOUS; SUBCUTANEOUS EVERY 8 HOURS
Refills: 0 | Status: DISCONTINUED | OUTPATIENT
Start: 2022-06-13 | End: 2022-06-13

## 2022-06-13 RX ORDER — SENNA PLUS 8.6 MG/1
2 TABLET ORAL AT BEDTIME
Refills: 0 | Status: DISCONTINUED | OUTPATIENT
Start: 2022-06-13 | End: 2022-06-13

## 2022-06-13 RX ORDER — MAGNESIUM HYDROXIDE 400 MG/1
30 TABLET, CHEWABLE ORAL EVERY 12 HOURS
Refills: 0 | Status: DISCONTINUED | OUTPATIENT
Start: 2022-06-13 | End: 2022-06-13

## 2022-06-13 RX ORDER — INSULIN LISPRO 100/ML
VIAL (ML) SUBCUTANEOUS
Refills: 0 | Status: DISCONTINUED | OUTPATIENT
Start: 2022-06-13 | End: 2022-06-16

## 2022-06-13 RX ORDER — CELECOXIB 200 MG/1
200 CAPSULE ORAL ONCE
Refills: 0 | Status: COMPLETED | OUTPATIENT
Start: 2022-06-13 | End: 2022-06-13

## 2022-06-13 RX ORDER — PANTOPRAZOLE SODIUM 20 MG/1
40 TABLET, DELAYED RELEASE ORAL
Refills: 0 | Status: DISCONTINUED | OUTPATIENT
Start: 2022-06-13 | End: 2022-06-13

## 2022-06-13 RX ORDER — SENNA PLUS 8.6 MG/1
2 TABLET ORAL AT BEDTIME
Refills: 0 | Status: DISCONTINUED | OUTPATIENT
Start: 2022-06-13 | End: 2022-06-16

## 2022-06-13 RX ORDER — APREPITANT 80 MG/1
40 CAPSULE ORAL ONCE
Refills: 0 | Status: COMPLETED | OUTPATIENT
Start: 2022-06-13 | End: 2022-06-13

## 2022-06-13 RX ORDER — ACETAMINOPHEN 500 MG
975 TABLET ORAL ONCE
Refills: 0 | Status: COMPLETED | OUTPATIENT
Start: 2022-06-13 | End: 2022-06-13

## 2022-06-13 RX ADMIN — CELECOXIB 200 MILLIGRAM(S): 200 CAPSULE ORAL at 06:17

## 2022-06-13 RX ADMIN — Medication 975 MILLIGRAM(S): at 14:57

## 2022-06-13 RX ADMIN — HYDROMORPHONE HYDROCHLORIDE 30 MILLILITER(S): 2 INJECTION INTRAMUSCULAR; INTRAVENOUS; SUBCUTANEOUS at 13:28

## 2022-06-13 RX ADMIN — HYDROMORPHONE HYDROCHLORIDE 30 MILLILITER(S): 2 INJECTION INTRAMUSCULAR; INTRAVENOUS; SUBCUTANEOUS at 15:00

## 2022-06-13 RX ADMIN — Medication 975 MILLIGRAM(S): at 14:27

## 2022-06-13 RX ADMIN — METHOCARBAMOL 750 MILLIGRAM(S): 500 TABLET, FILM COATED ORAL at 14:44

## 2022-06-13 RX ADMIN — HYDROMORPHONE HYDROCHLORIDE 30 MILLILITER(S): 2 INJECTION INTRAMUSCULAR; INTRAVENOUS; SUBCUTANEOUS at 19:27

## 2022-06-13 RX ADMIN — ATORVASTATIN CALCIUM 80 MILLIGRAM(S): 80 TABLET, FILM COATED ORAL at 22:35

## 2022-06-13 RX ADMIN — SENNA PLUS 2 TABLET(S): 8.6 TABLET ORAL at 22:35

## 2022-06-13 RX ADMIN — Medication 975 MILLIGRAM(S): at 06:18

## 2022-06-13 RX ADMIN — SODIUM CHLORIDE 75 MILLILITER(S): 9 INJECTION, SOLUTION INTRAVENOUS at 13:38

## 2022-06-13 RX ADMIN — APREPITANT 40 MILLIGRAM(S): 80 CAPSULE ORAL at 06:17

## 2022-06-13 RX ADMIN — Medication 100 MILLIGRAM(S): at 11:30

## 2022-06-13 RX ADMIN — HYDROMORPHONE HYDROCHLORIDE 30 MILLILITER(S): 2 INJECTION INTRAMUSCULAR; INTRAVENOUS; SUBCUTANEOUS at 15:50

## 2022-06-13 RX ADMIN — METHOCARBAMOL 750 MILLIGRAM(S): 500 TABLET, FILM COATED ORAL at 22:35

## 2022-06-13 RX ADMIN — Medication 100 MILLIGRAM(S): at 22:34

## 2022-06-13 RX ADMIN — CELECOXIB 200 MILLIGRAM(S): 200 CAPSULE ORAL at 18:31

## 2022-06-13 RX ADMIN — Medication 975 MILLIGRAM(S): at 22:35

## 2022-06-13 NOTE — BRIEF OPERATIVE NOTE - NSICDXBRIEFPROCEDURE_GEN_ALL_CORE_FT
PROCEDURES:  Posterior lumbar interbody fusion (PLIF) 13-Jun-2022 06:41:06  Yair Aguero  Lumbar laminectomy with pedicle screw fusion 13-Jun-2022 06:41:19  Yair Aguero  
PROCEDURES:  Posterior lumbar interbody fusion (PLIF) 13-Jun-2022 06:41:06  Yair Aguero  Lumbar laminectomy with pedicle screw fusion 13-Jun-2022 06:41:19  Yair Aguero

## 2022-06-13 NOTE — DISCHARGE NOTE PROVIDER - NSDCFUADDINST_GEN_ALL_CORE_FT
Follow-up with Dr. Aguero within 7-10 days. Dressing change daily until dry, then leave dressing in place until office follow-up. Pain medications as indicated and titrated to patient's needs. Patient will begin aspirin 325mg once daily for 4 weeks post-operatively for clot prevention. LSO brace as needed for comfort when out of bed. Ambulate with assistance of walker. Contact office if the wound becomes red, opens or discharge increases. Patient will take Robaxin as needed for muscle spasms.

## 2022-06-13 NOTE — DISCHARGE NOTE PROVIDER - NSDCACTIVITY_GEN_ALL_CORE
Do not drive or operate machinery/Do not make important decisions/Walking - Indoors allowed/Follow Instructions Provided by your Surgical Team

## 2022-06-13 NOTE — DISCHARGE NOTE PROVIDER - NSDCCPCAREPLAN_GEN_ALL_CORE_FT
PRINCIPAL DISCHARGE DIAGNOSIS  Diagnosis: Spinal stenosis of lumbar region with neurogenic claudication  Assessment and Plan of Treatment:

## 2022-06-13 NOTE — PHYSICAL THERAPY INITIAL EVALUATION ADULT - ADDITIONAL COMMENTS
Pt reports living with spouse in a house with 3 MIK without rail, and resides on main level. Pt amb without DME and independent with functional mobility, ADLs, and IADLs. Pt's spouse will be available to assists as needed. Pt drives and owns RW and SAC.

## 2022-06-13 NOTE — BRIEF OPERATIVE NOTE - NSICDXBRIEFPREOP_GEN_ALL_CORE_FT
PRE-OP DIAGNOSIS:  Lumbar spondylosis 13-Jun-2022 06:41:31  Yair Aguero  
PRE-OP DIAGNOSIS:  Lumbar spondylosis 13-Jun-2022 06:41:31  Yair Aguero

## 2022-06-13 NOTE — BRIEF OPERATIVE NOTE - OPERATION/FINDINGS
severe spondylosis
I personally assisted all aspects of positioning prone for a posterior lumbar approach on a modular Alfred table. Lumbar spine was cleansed with Betadine by me then cleansed with DuraPrep by RN.  I marked operative area with the use of C-arm.  I participated in a positioning of blue drapes and ioban and participated in operative timeout.  A longitudinal incision was placed over the L2-L5 spinous processes, 20 mL of 0.25 %marcaine with epi local anesthetic was placed. I assisted in dissection to the spinous processes then bilateral lamina at L2-L5 on the right.   During screw placement I performed the millivolt potential analysis for each screw, ensuring acceptable values.  I assisted with placement of posterolateral graft. Rods were then placed with my assistance and I confirmed that pedicle screws were appropriately tightened and torqued.  I provided visualization and assisted with hemostasis throughout the procedure by using sequential retraction, continuous suction, utilization of Surgi-Jermaine and dry gel foam, packing with Ray-Pepito, and Bovie cauterization.  I made drain incision cranial to surgical incision, and placed HV drain. The surgical area was irrigated copiously.   I assisted in Deep fascial closure was conducted with 0 Vicryl.  I personally performed closure of superficial fascia with 2-0 Vicryl, Monocryl, Dermabond, longitudinal Steri-Strips, Mepilex then Island Dressing.  Xeroform,  2 x 2, Tegaderm was placed over the drain site. Placed Marcaine/Exparel 30 cc local anesthetic.

## 2022-06-13 NOTE — DISCHARGE NOTE PROVIDER - NSDCCPTREATMENT_GEN_ALL_CORE_FT
PRINCIPAL PROCEDURE  Procedure: Posterior lumbar interbody fusion (PLIF)  Findings and Treatment:       SECONDARY PROCEDURE  Procedure: Lumbar laminectomy with pedicle screw fusion  Findings and Treatment:

## 2022-06-13 NOTE — PHYSICAL THERAPY INITIAL EVALUATION ADULT - TRANSFER SAFETY CONCERNS NOTED: SIT/STAND, REHAB EVAL
decreased safety awareness/losing balance
The patient is a 6y11m Female complaining of see chief complaint quote.

## 2022-06-13 NOTE — BRIEF OPERATIVE NOTE - NSICDXBRIEFPOSTOP_GEN_ALL_CORE_FT
POST-OP DIAGNOSIS:  Lumbar spondylosis 13-Jun-2022 06:41:43  Yair Aguero  
POST-OP DIAGNOSIS:  Lumbar spondylosis 13-Jun-2022 06:41:43  Yair Aguero

## 2022-06-13 NOTE — DISCHARGE NOTE PROVIDER - NSDCMRMEDTOKEN_GEN_ALL_CORE_FT
Aleve: orally 2 times a day  Clarinex-D 12 Hour oral tablet, extended release: 1 tab(s) orally every 12 hours  DULOXETINE HYDROCHLORIDE 60MG DR CAP:   FLUTICASONE PROP 50 MCG SPRAY: instill 2 sprays into each nostril once daily  ROSUVASTATIN CALCIUM 20 MG TAB: take 1 tablet by mouth at bedtime  TRAMADOL HCL 50 MG TABLET: take 1 tablet by mouth every 6 hours AS NEEDED FOR PAIN   acetaminophen 325 mg oral capsule: 2 cap(s) orally every 6 hours  for mild to moderate pain. Do not exceed 4000 mg in 24 hours.  aspirin 325 mg oral delayed release tablet: 1 tab(s) orally once a day - take with food.   Clarinex-D 12 Hour oral tablet, extended release: 1 tab(s) orally every 12 hours  DULoxetine 60 mg oral delayed release capsule: 1 cap(s) orally once a day  FLUTICASONE PROP 50 MCG SPRAY: instill 2 sprays into each nostril once daily  methocarbamol 750 mg oral tablet: 1 tab(s) orally every 8 hours, As Needed for muscle spasms.   ROSUVASTATIN CALCIUM 20 MG TAB: take 1 tablet by mouth at bedtime  Senna S 50 mg-8.6 mg oral tablet: 2 tab(s) orally once a day (at bedtime) .  traMADol 50 mg oral tablet: 1 tab(s) orally every 6 hours, As Needed for severe pain. MDD:4

## 2022-06-13 NOTE — PROGRESS NOTE ADULT - SUBJECTIVE AND OBJECTIVE BOX
THI [id number]    67yFemale  Spondylosis of lumbar region without myelopathy or radiculopathy    FH: diabetes mellitus (Sibling)    FHx: heart disease (Mother)    Family history of stroke (Father)    Handoff    Spinal stenosis, lumbar region, with neurogenic claudication    Spondylosis without myelopathy or radiculopathy, lumbar region    Hyperlipidemia    Lumbar spondylosis    Lumbar spondylosis    Need for prophylactic measure    Spinal stenosis of lumbar region with neurogenic claudication    Posterior lumbar interbody fusion (PLIF)    Lumbar laminectomy with pedicle screw fusion    History of     History of cholecystectomy    H/O foot surgery    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      STATUS POST:  lumbar laminectomy L2 , L3, L4, L5/Posterior instrumented, non instrumented and interbody fusion L2-L3, L3-L4, L4-L5 for lumbar stenosis with neurogenic claudication, back pain respectively  SUBJECTIVE: Patient seen and examined doing well    Back Pain controlled, lower extremity pain resolving    OBJECTIVE:   T(C): 36.4 (22 @ 05:56), Max: 36.4 (22 @ 05:56)  HR: 82 (22 @ 05:56) (82 - 82)  BP: 125/65 (22 @ 05:56) (125/65 - 125/65)  RR: 16 (22 @ 05:56) (16 - 16)  SpO2: 98% (22 @ 05:56) (98% - 98%)   Constitutional:  Pleasant in no acute distress  Psych:A&Ox3  Abdominal: soft and supple non distended  Lymphatics: no pretibial pitting edema  Spine:          Dressing:  clean/dry/intact                            HV drain in place, patent               Sensation:            Upper extremity          grossly intact manually          Lower extremity           grossly intact manually                               Motor:         Lower extremity                         HF(l2)   KE(l3)    TA(l4)   EHL(l5)  GS(s1)                                                 R        5/5        5/5        5/5       5/5         5/5                                               L         5/5        5/5       5/5       5/5          5/5                                                        [x] warm well perfused; capillary refill <3 seconds                A/P : 67yFemale   S/P  Lumbar laminectomy and fusion as abovePOD#0  -    Pain control- PCA x 24-36 hours then would advise oxy 5/10/15 q 3 hours prn with multimodal acetaminophen, celebrex, methocarbamol.   -    DVT ppx: [ x]SCDs[ x] Pharmacolgic   mg once daily x 28 days.  If patient is not ambulating well consider lovenox.   -    Periop abx:  Ancef [x ]   -    Check AM labs    -    Monitor Drain Output, can discontinue drain when output equal or less than 10 cc/hr/12 hr.  change dressing when drain pulled and as needed, honeycomb dressing.    -  Resume home meds as appropriate  -PT/OT WBAT, balance and gait  - LSO with OOB not ordered/not mandatory.  Ortho/PT team can reevaluate possible benefit for additional external support daily, in post op period.   -medical follow up- Dr Mackay  - probable home 3-5 days when pain is controlled and ADL are attainable  - HLD- continue statin.   - high BMI- nutrition consult

## 2022-06-13 NOTE — DISCHARGE NOTE PROVIDER - HOSPITAL COURSE
Patient was admitted for elective posterior lumbar laminectomy on 6/13/22 for failed conservative treatment of persistent lower back pain. The hospital post operative course was uneventful.  The surgical dressing was changed and the drain was removed uneventfully.   PT/OT worked with patient for gait training. Pain was now adequately controlled and patient was discharged home in stable condition.  Chronic medical conditions were treated during the hospital stay.

## 2022-06-13 NOTE — CONSULT NOTE ADULT - CONSULT REASON
Med Humira Counseling:  I discussed with the patient the risks of adalimumab including but not limited to myelosuppression, immunosuppression, autoimmune hepatitis, demyelinating diseases, lymphoma, and serious infections.  The patient understands that monitoring is required including a PPD at baseline and must alert us or the primary physician if symptoms of infection or other concerning signs are noted.

## 2022-06-13 NOTE — DISCHARGE NOTE PROVIDER - CARE PROVIDER_API CALL
Yair Aguero (DO)  Orthopaedic Surgery  [address] [place] [address], [address]  [place] [address]  Phone: [phone]  Fax: [phone]  Follow Up Time:

## 2022-06-14 LAB
ANION GAP SERPL CALC-SCNC: 9 MMOL/L — SIGNIFICANT CHANGE UP (ref 5–17)
BASOPHILS # BLD AUTO: 0.02 K/UL — SIGNIFICANT CHANGE UP (ref 0–0.2)
BASOPHILS NFR BLD AUTO: 0.2 % — SIGNIFICANT CHANGE UP (ref 0–2)
BUN SERPL-MCNC: 14.4 MG/DL — SIGNIFICANT CHANGE UP (ref 8–20)
CALCIUM SERPL-MCNC: 8.3 MG/DL — LOW (ref 8.6–10.2)
CHLORIDE SERPL-SCNC: 103 MMOL/L — SIGNIFICANT CHANGE UP (ref 98–107)
CO2 SERPL-SCNC: 26 MMOL/L — SIGNIFICANT CHANGE UP (ref 22–29)
CREAT SERPL-MCNC: 0.58 MG/DL — SIGNIFICANT CHANGE UP (ref 0.5–1.3)
EGFR: 100 ML/MIN/1.73M2 — SIGNIFICANT CHANGE UP
EOSINOPHIL # BLD AUTO: 0 K/UL — SIGNIFICANT CHANGE UP (ref 0–0.5)
EOSINOPHIL NFR BLD AUTO: 0 % — SIGNIFICANT CHANGE UP (ref 0–6)
GLUCOSE BLDC GLUCOMTR-MCNC: 121 MG/DL — HIGH (ref 70–99)
GLUCOSE BLDC GLUCOMTR-MCNC: 133 MG/DL — HIGH (ref 70–99)
GLUCOSE BLDC GLUCOMTR-MCNC: 137 MG/DL — HIGH (ref 70–99)
GLUCOSE BLDC GLUCOMTR-MCNC: 143 MG/DL — HIGH (ref 70–99)
GLUCOSE SERPL-MCNC: 133 MG/DL — HIGH (ref 70–99)
HCT VFR BLD CALC: 30.3 % — LOW (ref 34.5–45)
HGB BLD-MCNC: 9.6 G/DL — LOW (ref 11.5–15.5)
IMM GRANULOCYTES NFR BLD AUTO: 0.6 % — SIGNIFICANT CHANGE UP (ref 0–1.5)
LYMPHOCYTES # BLD AUTO: 1.1 K/UL — SIGNIFICANT CHANGE UP (ref 1–3.3)
LYMPHOCYTES # BLD AUTO: 8.9 % — LOW (ref 13–44)
MCHC RBC-ENTMCNC: 31.6 PG — SIGNIFICANT CHANGE UP (ref 27–34)
MCHC RBC-ENTMCNC: 31.7 GM/DL — LOW (ref 32–36)
MCV RBC AUTO: 99.7 FL — SIGNIFICANT CHANGE UP (ref 80–100)
MONOCYTES # BLD AUTO: 1.16 K/UL — HIGH (ref 0–0.9)
MONOCYTES NFR BLD AUTO: 9.3 % — SIGNIFICANT CHANGE UP (ref 2–14)
NEUTROPHILS # BLD AUTO: 10.06 K/UL — HIGH (ref 1.8–7.4)
NEUTROPHILS NFR BLD AUTO: 81 % — HIGH (ref 43–77)
PLATELET # BLD AUTO: 222 K/UL — SIGNIFICANT CHANGE UP (ref 150–400)
POTASSIUM SERPL-MCNC: 4.5 MMOL/L — SIGNIFICANT CHANGE UP (ref 3.5–5.3)
POTASSIUM SERPL-SCNC: 4.5 MMOL/L — SIGNIFICANT CHANGE UP (ref 3.5–5.3)
RBC # BLD: 3.04 M/UL — LOW (ref 3.8–5.2)
RBC # FLD: 13.2 % — SIGNIFICANT CHANGE UP (ref 10.3–14.5)
SARS-COV-2 RNA SPEC QL NAA+PROBE: SIGNIFICANT CHANGE UP
SODIUM SERPL-SCNC: 138 MMOL/L — SIGNIFICANT CHANGE UP (ref 135–145)
WBC # BLD: 12.41 K/UL — HIGH (ref 3.8–10.5)
WBC # FLD AUTO: 12.41 K/UL — HIGH (ref 3.8–10.5)

## 2022-06-14 RX ORDER — HYDROMORPHONE HYDROCHLORIDE 2 MG/ML
4 INJECTION INTRAMUSCULAR; INTRAVENOUS; SUBCUTANEOUS EVERY 4 HOURS
Refills: 0 | Status: DISCONTINUED | OUTPATIENT
Start: 2022-06-14 | End: 2022-06-15

## 2022-06-14 RX ORDER — TRAMADOL HYDROCHLORIDE 50 MG/1
50 TABLET ORAL EVERY 4 HOURS
Refills: 0 | Status: DISCONTINUED | OUTPATIENT
Start: 2022-06-14 | End: 2022-06-16

## 2022-06-14 RX ORDER — HYDROMORPHONE HYDROCHLORIDE 2 MG/ML
0.5 INJECTION INTRAMUSCULAR; INTRAVENOUS; SUBCUTANEOUS ONCE
Refills: 0 | Status: DISCONTINUED | OUTPATIENT
Start: 2022-06-14 | End: 2022-06-15

## 2022-06-14 RX ORDER — TRAMADOL HYDROCHLORIDE 50 MG/1
100 TABLET ORAL EVERY 6 HOURS
Refills: 0 | Status: DISCONTINUED | OUTPATIENT
Start: 2022-06-14 | End: 2022-06-16

## 2022-06-14 RX ADMIN — Medication 325 MILLIGRAM(S): at 12:37

## 2022-06-14 RX ADMIN — CELECOXIB 200 MILLIGRAM(S): 200 CAPSULE ORAL at 18:55

## 2022-06-14 RX ADMIN — HYDROMORPHONE HYDROCHLORIDE 30 MILLILITER(S): 2 INJECTION INTRAMUSCULAR; INTRAVENOUS; SUBCUTANEOUS at 07:36

## 2022-06-14 RX ADMIN — CELECOXIB 200 MILLIGRAM(S): 200 CAPSULE ORAL at 06:50

## 2022-06-14 RX ADMIN — HYDROMORPHONE HYDROCHLORIDE 4 MILLIGRAM(S): 2 INJECTION INTRAMUSCULAR; INTRAVENOUS; SUBCUTANEOUS at 23:30

## 2022-06-14 RX ADMIN — ATORVASTATIN CALCIUM 80 MILLIGRAM(S): 80 TABLET, FILM COATED ORAL at 22:46

## 2022-06-14 RX ADMIN — METHOCARBAMOL 750 MILLIGRAM(S): 500 TABLET, FILM COATED ORAL at 14:46

## 2022-06-14 RX ADMIN — Medication 100 MILLIGRAM(S): at 06:31

## 2022-06-14 RX ADMIN — TRAMADOL HYDROCHLORIDE 100 MILLIGRAM(S): 50 TABLET ORAL at 17:33

## 2022-06-14 RX ADMIN — CELECOXIB 200 MILLIGRAM(S): 200 CAPSULE ORAL at 17:33

## 2022-06-14 RX ADMIN — CELECOXIB 200 MILLIGRAM(S): 200 CAPSULE ORAL at 06:32

## 2022-06-14 RX ADMIN — DULOXETINE HYDROCHLORIDE 60 MILLIGRAM(S): 30 CAPSULE, DELAYED RELEASE ORAL at 12:37

## 2022-06-14 RX ADMIN — Medication 975 MILLIGRAM(S): at 16:30

## 2022-06-14 RX ADMIN — SENNA PLUS 2 TABLET(S): 8.6 TABLET ORAL at 22:46

## 2022-06-14 RX ADMIN — Medication 975 MILLIGRAM(S): at 06:59

## 2022-06-14 RX ADMIN — Medication 975 MILLIGRAM(S): at 14:42

## 2022-06-14 RX ADMIN — MAGNESIUM HYDROXIDE 30 MILLILITER(S): 400 TABLET, CHEWABLE ORAL at 14:43

## 2022-06-14 RX ADMIN — Medication 975 MILLIGRAM(S): at 06:32

## 2022-06-14 RX ADMIN — HYDROMORPHONE HYDROCHLORIDE 4 MILLIGRAM(S): 2 INJECTION INTRAMUSCULAR; INTRAVENOUS; SUBCUTANEOUS at 22:46

## 2022-06-14 RX ADMIN — Medication 975 MILLIGRAM(S): at 22:46

## 2022-06-14 RX ADMIN — METHOCARBAMOL 750 MILLIGRAM(S): 500 TABLET, FILM COATED ORAL at 06:33

## 2022-06-14 RX ADMIN — PANTOPRAZOLE SODIUM 40 MILLIGRAM(S): 20 TABLET, DELAYED RELEASE ORAL at 06:32

## 2022-06-14 RX ADMIN — METHOCARBAMOL 750 MILLIGRAM(S): 500 TABLET, FILM COATED ORAL at 22:46

## 2022-06-14 RX ADMIN — TRAMADOL HYDROCHLORIDE 100 MILLIGRAM(S): 50 TABLET ORAL at 18:55

## 2022-06-14 NOTE — OCCUPATIONAL THERAPY INITIAL EVALUATION ADULT - GENERAL OBSERVATIONS, REHAB EVAL
pt received sitting edge of bed and left in bedside chair, CBWR, no c/o pain, pt pleasant, motivated and following all commands

## 2022-06-14 NOTE — CONSULT NOTE ADULT - ASSESSMENT
67F  pending lumbar fusion  chronic opioid tx on tramadol 50 qid per istop      - Recommend starting oxycodone PO 5mg/10mg [id number] PRN mod/severe pain  - Recommend starting hydromorphone IVP 0.5mg q4hour PRN breakthrough pain  - Recommend starting acetaminophen PO 975mg [id number] standing. HOLD for liver function test dysfunction  - Recommend starting gabapentin PO 300mg [id number] standing. HOLD for sedation  - If no contraindication to NSAIDs, recommend starting ketorolac IV 30mg [id number] standing x 4 doses. Afterwards, can use celebrex 200mg PO q12h x 7days, with food. HOLD for black/red stools.  - Recommend starting robaxin 750mg q8h standing. HOLD for sedation 
67F  s/p lumbar fusion  chronic opioid tx on tramadol 50 qid per istop    DC PCA  - Recommend starting tramadol PO 50mg [id number] /10mg [id number] PRN mild/mod pain, max daily dose = 400mg for tramadol  - start dilaudid po 4mg q3h prn severe pain  - start home cymbalta 60mg qhs  - Recommend starting acetaminophen PO 975mg [id number] standing. HOLD for liver function test dysfunction  - Recommend starting gabapentin PO 300mg [id number] standing. HOLD for sedation  - Recommend starting robaxin 750mg q8h standing. HOLD for sedation

## 2022-06-14 NOTE — CONSULT NOTE ADULT - SUBJECTIVE AND OBJECTIVE BOX
Patient is a 67y old  Female who presents with a chief complaint of " Pre-testing for a lumbar fusion of L2-L5" (07 Jun 2022 11:03)      HPI:  Pt 67 years-old female seen today pre-op for Lumbar fusion L2-L5, Interbody fusion, L3-L4, L4-L5 LAMINECTOMY l2-L5. Pt with PMH of HLD, Lumbar stenosis and pain, Polymyalgia Rheumatica, Pt report longstanding lumbar pain for more than 2 years, lumbar pain has progressively worsening within the last 9 months. Pt denies any trauma to site, report pain radiating into the RLE. Pain aggravated by ambulation, standing or sitting for a long period of time, alleviating factors include leaning forward, short period of sitting down, pain medication aleve and tramadol, report occasional tingling and numbness of lumbar region. Pt denies ataxia, no incontinence, good dexterity, no urinary retention, no fever/chills. Pt sen today for a scheduled surgery on 6/13/2022 with Dr. Aguero.   (07 Jun 2022 11:03)    Reference #: [id number]     05/03/2022 05/03/2022 phentermine 15 mg capsule  21 21 Conner Castro MD [id number] Insurance [facility] [id number]   03/24/2022 03/25/2022 tramadol hcl 50 mg tablet  60 15 Emma Betancourt [id number] Insurance [facility] [id number]   12/15/2021 03/10/2022 green chewable 5.0mg/5.0mg cbd chewable tablet (lemon)  1 3 Trini Toro M , [id number] Cash [facility]   12/15/2021 03/10/2022 well-being (1:4) 1 mgthc and 4 mgcbd/0.5 ml tincture  1 3 Trini Toro M , [id number] Cash [facility] [place]   03/08/2022 03/10/2022 tramadol hcl 50 mg tablet  60 15 Emma Betancourt [id number] Insurance [facility] [id number]   12/16/2021 01/07/2022 well-being (1:4) 1 mgthc and 4 mgcbd/0.5 ml tincture  1 5 Trini Toro M , [id number] Cash [facility] [place]     Interval Hx:  Patient seen during rounds  Patient reports pain to be controlled on current medications - ~1mg of iv dilaudid q4 hours  does not want to transition to oxycodone, prefers tramadol isntead  takes tramadol 50mg at home prn  Patient denies sedation with medications     Analgesic Dosing for past 24 hours reviewed as below:    acetaminophen     Tablet ..   975 milliGRAM(s) Oral (06-13-22 @ 06:18)    aprepitant   40 milliGRAM(s) Oral (06-13-22 @ 06:17)    celecoxib   200 milliGRAM(s) Oral (06-13-22 @ 06:17)          T(C): 36.4 (06-13-22 @ 05:56), Max: 36.4 (06-13-22 @ 05:56)  HR: 82 (06-13-22 @ 05:56) (82 - 82)  BP: 125/65 (06-13-22 @ 05:56) (125/65 - 125/65)  RR: 16 (06-13-22 @ 05:56) (16 - 16)  SpO2: 98% (06-13-22 @ 05:56) (98% - 98%)        acetaminophen     Tablet .. 975 milliGRAM(s) Oral every 8 hours  aluminum hydroxide/magnesium hydroxide/simethicone Suspension 30 milliLiter(s) Oral every 12 hours PRN  BUpivacaine liposome 1.3% Injectable 20 milliLiter(s) Local Injection once  ceFAZolin   IVPB 2000 milliGRAM(s) IV Intermittent once  ceFAZolin   IVPB 2000 milliGRAM(s) IV Intermittent <User Schedule>  celecoxib 200 milliGRAM(s) Oral every 12 hours  lactated ringers. 1000 milliLiter(s) IV Continuous <Continuous>  magnesium hydroxide Suspension 30 milliLiter(s) Oral every 12 hours PRN  methocarbamol 750 milliGRAM(s) Oral every 8 hours  ondansetron Injectable 4 milliGRAM(s) IV Push every 6 hours PRN  ondansetron Injectable 4 milliGRAM(s) IV Push every 6 hours PRN  pantoprazole    Tablet 40 milliGRAM(s) Oral before breakfast  senna 2 Tablet(s) Oral at bedtime  sodium chloride 0.9% lock flush 3 milliLiter(s) IV Push every 8 hours  tranexamic acid IVPB 1000 milliGRAM(s) IV Intermittent once                  Pain Service   [phone]
Patient is a 67y old  Female who presents with a chief complaint of " Pre-testing for a lumbar fusion of L2-L5" (07 Jun 2022 11:03)      HPI:  Pt 67 years-old female seen today pre-op for Lumbar fusion L2-L5, Interbody fusion, L3-L4, L4-L5 LAMINECTOMY l2-L5. Pt with PMH of HLD, Lumbar stenosis and pain, Polymyalgia Rheumatica, Pt report longstanding lumbar pain for more than 2 years, lumbar pain has progressively worsening within the last 9 months. Pt denies any trauma to site, report pain radiating into the RLE. Pain aggravated by ambulation, standing or sitting for a long period of time, alleviating factors include leaning forward, short period of sitting down, pain medication aleve and tramadol, report occasional tingling and numbness of lumbar region. Pt denies ataxia, no incontinence, good dexterity, no urinary retention, no fever/chills. Pt sen today for a scheduled surgery on 6/13/2022 with Dr. Aguero.   (07 Jun 2022 11:03)    Reference #: [id number]     05/03/2022 05/03/2022 phentermine 15 mg capsule  21 21 Conner Castro MD [id number] Insurance [facility] [id number]   03/24/2022 03/25/2022 tramadol hcl 50 mg tablet  60 15 Emma Betancourt [id number] Insurance [facility] [id number]   12/15/2021 03/10/2022 green chewable 5.0mg/5.0mg cbd chewable tablet (lemon)  1 3 Trini Toro M , [id number] Cash [facility] [place]   12/15/2021 03/10/2022 well-being (1:4) 1 mgthc and 4 mgcbd/0.5 ml tincture  1 3 Trini Toro M , [id number] Cash [facility] [place]   03/08/2022 03/10/2022 tramadol hcl 50 mg tablet  60 15 Emma Betancourt [id number] Insurance [facility] [id number]   12/16/2021 01/07/2022 well-being (1:4) 1 mgthc and 4 mgcbd/0.5 ml tincture  1 5 Trini Toro M , [id number] Cash [facility]         Analgesic Dosing for past 24 hours reviewed as below:    acetaminophen     Tablet ..   975 milliGRAM(s) Oral (06-13-22 @ 06:18)    aprepitant   40 milliGRAM(s) Oral (06-13-22 @ 06:17)    celecoxib   200 milliGRAM(s) Oral (06-13-22 @ 06:17)          T(C): 36.4 (06-13-22 @ 05:56), Max: 36.4 (06-13-22 @ 05:56)  HR: 82 (06-13-22 @ 05:56) (82 - 82)  BP: 125/65 (06-13-22 @ 05:56) (125/65 - 125/65)  RR: 16 (06-13-22 @ 05:56) (16 - 16)  SpO2: 98% (06-13-22 @ 05:56) (98% - 98%)        acetaminophen     Tablet .. 975 milliGRAM(s) Oral every 8 hours  aluminum hydroxide/magnesium hydroxide/simethicone Suspension 30 milliLiter(s) Oral every 12 hours PRN  BUpivacaine liposome 1.3% Injectable 20 milliLiter(s) Local Injection once  ceFAZolin   IVPB 2000 milliGRAM(s) IV Intermittent once  ceFAZolin   IVPB 2000 milliGRAM(s) IV Intermittent <User Schedule>  celecoxib 200 milliGRAM(s) Oral every 12 hours  lactated ringers. 1000 milliLiter(s) IV Continuous <Continuous>  magnesium hydroxide Suspension 30 milliLiter(s) Oral every 12 hours PRN  methocarbamol 750 milliGRAM(s) Oral every 8 hours  ondansetron Injectable 4 milliGRAM(s) IV Push every 6 hours PRN  ondansetron Injectable 4 milliGRAM(s) IV Push every 6 hours PRN  pantoprazole    Tablet 40 milliGRAM(s) Oral before breakfast  senna 2 Tablet(s) Oral at bedtime  sodium chloride 0.9% lock flush 3 milliLiter(s) IV Push every 8 hours  tranexamic acid IVPB 1000 milliGRAM(s) IV Intermittent once                  Pain Service   [phone]
HPI:  Pt 67 years-old female seen  for Lumbar fusion L2-L5, Interbody fusion, L3-L4, L4-L5 LAMINECTOMY l2-L5. Pt with PMH of HLD, Lumbar stenosis and pain, Polymyalgia Rheumatica, Pt post op today for a scheduled surgery with Dr. Aguero.       Home Medications:     · 	ROSUVASTATIN CALCIUM 20 MG TAB: Last Dose Taken:  , take 1 tablet by mouth at bedtime  · 	TRAMADOL HCL 50 MG TABLET: Last Dose Taken:  , take 1 tablet by mouth every 6 hours AS NEEDED FOR PAIN  · 	DULOXETINE HYDROCHLORIDE 60MG DR CAP: Last Dose Taken:    · 	FLUTICASONE PROP 50 MCG SPRAY: Last Dose Taken:  , instill 2 sprays into each nostril once daily  · 	Aleve: Last Dose Taken:  , orally 2 times a day  · 	Clarinex-D 12 Hour oral tablet, extended release: Last Dose Taken:  , 1 tab(s) orally every 12 hours      PAST MEDICAL & SURGICAL HISTORY:  Spinal stenosis, lumbar region, with neurogenic claudication      Spondylosis without myelopathy or radiculopathy, lumbar region  Hyperlipidemia  History of C-sectionx2  History of cholecystectomy  H/O foot surgery        acetaminophen     Tablet .. 975 milliGRAM(s) Oral every 8 hours  aluminum hydroxide/magnesium hydroxide/simethicone Suspension 30 milliLiter(s) Oral every 12 hours PRN  atorvastatin 80 milliGRAM(s) Oral at bedtime  ceFAZolin   IVPB 2000 milliGRAM(s) IV Intermittent <User Schedule>  celecoxib 200 milliGRAM(s) Oral every 12 hours  DULoxetine 60 milliGRAM(s) Oral daily  HYDROmorphone PCA (1 mG/mL) 30 milliLiter(s) PCA Continuous PCA Continuous  lactated ringers. 1000 milliLiter(s) IV Continuous <Continuous>  magnesium hydroxide Suspension 30 milliLiter(s) Oral every 12 hours PRN  methocarbamol 750 milliGRAM(s) Oral every 8 hours  naloxone Injectable 0.1 milliGRAM(s) IV Push every 3 minutes PRN  ondansetron Injectable 4 milliGRAM(s) IV Push every 6 hours PRN  ondansetron Injectable 4 milliGRAM(s) IV Push every 6 hours PRN  ondansetron Injectable 4 milliGRAM(s) IV Push every 6 hours PRN  pantoprazole    Tablet 40 milliGRAM(s) Oral before breakfast  senna 2 Tablet(s) Oral at bedtime    MEDICATIONS  (STANDING):  acetaminophen     Tablet .. 975 milliGRAM(s) Oral every 8 hours  atorvastatin 80 milliGRAM(s) Oral at bedtime  ceFAZolin   IVPB 2000 milliGRAM(s) IV Intermittent <User Schedule>  celecoxib 200 milliGRAM(s) Oral every 12 hours  DULoxetine 60 milliGRAM(s) Oral daily  HYDROmorphone PCA (1 mG/mL) 30 milliLiter(s) PCA Continuous PCA Continuous  lactated ringers. 1000 milliLiter(s) (80 mL/Hr) IV Continuous <Continuous>  methocarbamol 750 milliGRAM(s) Oral every 8 hours  pantoprazole    Tablet 40 milliGRAM(s) Oral before breakfast  senna 2 Tablet(s) Oral at bedtime    MEDICATIONS  (PRN):  aluminum hydroxide/magnesium hydroxide/simethicone Suspension 30 milliLiter(s) Oral every 12 hours PRN Indigestion  magnesium hydroxide Suspension 30 milliLiter(s) Oral every 12 hours PRN Constipation  naloxone Injectable 0.1 milliGRAM(s) IV Push every 3 minutes PRN For ANY of the following changes in patient status:  A. RR LESS THAN 10 breaths per minute, B. Oxygen saturation LESS THAN 90%, C. Sedation score of 6  ondansetron Injectable 4 milliGRAM(s) IV Push every 6 hours PRN Nausea  ondansetron Injectable 4 milliGRAM(s) IV Push every 6 hours PRN Nausea and/or Vomiting  ondansetron Injectable 4 milliGRAM(s) IV Push every 6 hours PRN Nausea and/or Vomiting      Allergies    No Known Allergies    Intolerances        SOCIAL HISTORY:  NO S/D/ IVDU    FAMILY HISTORY:  FH: diabetes mellitus (Sibling)    FHx: heart disease (Mother)    Family history of stroke (Father)        LABS:                        11.9   14.17 )-----------( 250      ( 13 Jun 2022 13:52 )             36.9         ROS  - Headache  - Neck Stiffness  - Chest Pain  - SOB  - Abd pain  - Pelvic Pain  - Leg Pain  + mild Back pain       Vital Signs Last 24 Hrs  T(C): 36.6 (13 Jun 2022 15:53), Max: 36.6 (13 Jun 2022 15:53)  T(F): 97.9 (13 Jun 2022 15:53), Max: 97.9 (13 Jun 2022 15:53)  HR: 110 (13 Jun 2022 15:53) (82 - 124)  BP: 107/75 (13 Jun 2022 15:53) (92/73 - 125/65)  BP(mean): 81 (13 Jun 2022 15:00) (71 - 87)  RR: 20 (13 Jun 2022 15:53) (13 - 21)  SpO2: 99% (13 Jun 2022 15:53) (98% - 100%)  HEENT: PEARLA  Neck: Supple  Cardio: S1 S2 1/6 SE Murmur  Pulm: CTA No Rales or Ronchi  Abd: Soft NT ND BS+  Back - Incision bandage clean Drain   Rectal / pelvic Breast - refused  Ext: No DCT  Skin: No Rash  Neuro: Awake Pleasant      Lumbar fusion L2-L5, Interbody fusion, L3-L4, L4-L5 LAMINECTOMY l2-L5 - pain control and ambulation  Post Op Hypotension - IV bolus, cont IV fluids will monitor, secondary to opiates   Mild Nausea - zofran prn   Post op Hyperglycemia - diet education will follow Humalog for 24 hours  Hyperlipidemia - statin  History of C-sectionx2  History of cholecystectomy  H/O foot surgery

## 2022-06-14 NOTE — PROGRESS NOTE ADULT - SUBJECTIVE AND OBJECTIVE BOX
HPI:  Pt 67 years-old female post op Day #1       Allergies    No Known Allergies    Intolerances      Spinal stenosis, lumbar region, with neurogenic claudication    Spondylosis without myelopathy or radiculopathy, lumbar region    Hyperlipidemia      MEDICATIONS  (STANDING):  acetaminophen     Tablet .. 975 milliGRAM(s) Oral every 8 hours  aspirin enteric coated 325 milliGRAM(s) Oral daily  atorvastatin 80 milliGRAM(s) Oral at bedtime  celecoxib 200 milliGRAM(s) Oral every 12 hours  dextrose 5%. 1000 milliLiter(s) (50 mL/Hr) IV Continuous <Continuous>  dextrose 5%. 1000 milliLiter(s) (100 mL/Hr) IV Continuous <Continuous>  dextrose 50% Injectable 25 Gram(s) IV Push once  dextrose 50% Injectable 12.5 Gram(s) IV Push once  dextrose 50% Injectable 25 Gram(s) IV Push once  DULoxetine 60 milliGRAM(s) Oral daily  glucagon  Injectable 1 milliGRAM(s) IntraMuscular once  insulin lispro (ADMELOG) corrective regimen sliding scale   SubCutaneous three times a day before meals  lactated ringers. 1000 milliLiter(s) (80 mL/Hr) IV Continuous <Continuous>  methocarbamol 750 milliGRAM(s) Oral every 8 hours  pantoprazole    Tablet 40 milliGRAM(s) Oral before breakfast  senna 2 Tablet(s) Oral at bedtime    MEDICATIONS  (PRN):  aluminum hydroxide/magnesium hydroxide/simethicone Suspension 30 milliLiter(s) Oral every 12 hours PRN Indigestion  dextrose Oral Gel 15 Gram(s) Oral once PRN Blood Glucose LESS THAN 70 milliGRAM(s)/deciliter  HYDROmorphone   Tablet 4 milliGRAM(s) Oral every 4 hours PRN Severe Pain (7 - 10)  HYDROmorphone  Injectable 0.5 milliGRAM(s) IV Push once PRN Breakthrough  magnesium hydroxide Suspension 30 milliLiter(s) Oral every 12 hours PRN Constipation  naloxone Injectable 0.1 milliGRAM(s) IV Push every 3 minutes PRN For ANY of the following changes in patient status:  A. RR LESS THAN 10 breaths per minute, B. Oxygen saturation LESS THAN 90%, C. Sedation score of 6  ondansetron Injectable 4 milliGRAM(s) IV Push every 6 hours PRN Nausea  ondansetron Injectable 4 milliGRAM(s) IV Push every 6 hours PRN Nausea and/or Vomiting  ondansetron Injectable 4 milliGRAM(s) IV Push every 6 hours PRN Nausea and/or Vomiting  traMADol 50 milliGRAM(s) Oral every 4 hours PRN Mild Pain (1 - 3)  traMADol 100 milliGRAM(s) Oral every 6 hours PRN Moderate Pain (4 - 6)                           9.6    12.41 )-----------( 222      ( 14 Jun 2022 05:42 )             30.3     06-14    138  |  103  |  14.4  ----------------------------<  133<H>  4.5   |  26.0  |  0.58    Ca    8.3<L>      14 Jun 2022 05:42        ;  Vital Signs Last 24 Hrs  T(C): 36.9 (14 Jun 2022 16:45), Max: 36.9 (14 Jun 2022 16:45)  T(F): 98.4 (14 Jun 2022 16:45), Max: 98.4 (14 Jun 2022 16:45)  HR: 74 (14 Jun 2022 16:45) (66 - 86)  BP: 119/73 (14 Jun 2022 16:45) (110/63 - 119/73)  BP(mean): --  RR: 17 (14 Jun 2022 16:45) (17 - 18)  SpO2: 94% (14 Jun 2022 16:45) (91% - 97%)  CAPILLARY BLOOD GLUCOSE      06-13 @ 07:01 - 06-14 @ 07:00  --------------------------------------------------------  IN: 560 mL / OUT: 2910 mL / NET: -2350 mL    06-14 @ 07:01 - 06-14 @ 22:47  --------------------------------------------------------  IN: 0 mL / OUT: 1570 mL / NET: -1570 mL      Patient feeling better No CP, No SOB, No N/V Mild back pain  HEENT: PEARLA  Neck: Supple  Cardio: S1 S2 1/6 SE Murmur  Pulm: CTA No Rales or Ronchi  Abd: Soft NT ND BS+  Back - Incision bandage clean Drain   Rectal / pelvic Breast - refused  Ext: No DCT  Skin: No Rash  Neuro: Awake Pleasant      Lumbar fusion L2-L5, Interbody fusion, L3-L4, L4-L5 LAMINECTOMY l2-L5 - pain control and ambulation  Post Op Hypotension - IV bolus, cont IV fluids will monitor, secondary to opiates   Acute blood loss anemia - follw Hb mild symptoms   Mild Nausea - zofran prn   Post op Hyperglycemia - diet education will follow Humalog for 24 hours  Hyperlipidemia - statin  History of C-sectionx2  History of cholecystectomy  H/O foot surgery

## 2022-06-14 NOTE — PROGRESS NOTE ADULT - SUBJECTIVE AND OBJECTIVE BOX
ORTHO-SPINE POST-OP PROGRESS NOTE:      [id number]    THI BRANCH      PROCEDURE:    DOS:       SUBJECTIVE: Patient is a 67yFemale Patient seen and examined. Patient reports of moderate discomfort that is controlled by pain medications. Reports participating with PT and walking out to hallway and back. Patient denies of acute sensory or motor changes.                           9.6    12.41 )-----------( 222      ( 14 Jun 2022 05:42 )             30.3           I&O's Detail    13 Jun 2022 07:01  -  14 Jun 2022 07:00  --------------------------------------------------------  IN:    Lactated Ringers: 560 mL  Total IN: 560 mL    OUT:    Accordian (mL): 410 mL    Indwelling Catheter - Urethral (mL): 2500 mL  Total OUT: 2910 mL    Total NET: -2350 mL          acetaminophen     Tablet .. 975 milliGRAM(s) Oral every 8 hours  aluminum hydroxide/magnesium hydroxide/simethicone Suspension 30 milliLiter(s) Oral every 12 hours PRN  aspirin enteric coated 325 milliGRAM(s) Oral daily  atorvastatin 80 milliGRAM(s) Oral at bedtime  celecoxib 200 milliGRAM(s) Oral every 12 hours  dextrose 5%. 1000 milliLiter(s) IV Continuous <Continuous>  dextrose 5%. 1000 milliLiter(s) IV Continuous <Continuous>  dextrose 50% Injectable 25 Gram(s) IV Push once  dextrose 50% Injectable 12.5 Gram(s) IV Push once  dextrose 50% Injectable 25 Gram(s) IV Push once  dextrose Oral Gel 15 Gram(s) Oral once PRN  DULoxetine 60 milliGRAM(s) Oral daily  glucagon  Injectable 1 milliGRAM(s) IntraMuscular once  HYDROmorphone PCA (1 mG/mL) 30 milliLiter(s) PCA Continuous PCA Continuous  insulin lispro (ADMELOG) corrective regimen sliding scale   SubCutaneous three times a day before meals  lactated ringers. 1000 milliLiter(s) IV Continuous <Continuous>  magnesium hydroxide Suspension 30 milliLiter(s) Oral every 12 hours PRN  methocarbamol 750 milliGRAM(s) Oral every 8 hours  naloxone Injectable 0.1 milliGRAM(s) IV Push every 3 minutes PRN  ondansetron Injectable 4 milliGRAM(s) IV Push every 6 hours PRN  ondansetron Injectable 4 milliGRAM(s) IV Push every 6 hours PRN  ondansetron Injectable 4 milliGRAM(s) IV Push every 6 hours PRN  pantoprazole    Tablet 40 milliGRAM(s) Oral before breakfast  senna 2 Tablet(s) Oral at bedtime        T(C): 36.8 (06-14-22 @ 04:54), Max: 36.8 (06-13-22 @ 22:27)  HR: 86 (06-14-22 @ 04:54) (66 - 124)  BP: 110/63 (06-14-22 @ 04:54) (92/73 - 119/70)  RR: 18 (06-14-22 @ 04:54) (13 - 21)  SpO2: 91% (06-14-22 @ 04:54) (91% - 100%)  Wt(kg): --      PHYSICAL EXAM:     Constitutional: Alert, responsive, in no acute distress.     Spine:                     Dressing:  Clean/dry/intact. No bleeding, staining noted.         Abdomen: Soft, non-tender         Drains:  Drain intact, function well. Blood in canister. Drain output 260 ml over 12 hours         Sensation:          [ ] Upper extremity                      ax        mc           m          u          r                                                         R          +           +             +           +          +                                               L           +           +             +           +          +         [ ] Lower extremity             sp         dp         saph       ortiz         tibial                                                R          +           +             +           +          +                                               L           +           +             +           +          +                      Motor exam:          [ ] Upper extremity                        Bi(c5)  WE(c6)  EE(c7)   FF(c8)                                                R         5/5        5/5        5/5       5/5                                               L          5/5        5/5        5/5       5/5         [ ] Lower extremity                     HF(l2)   KE(l3)    TA(l4)   EHL(l5)  GS(s1)                                                 R        5/5        5/5        5/5       5/5         5/5                                               L         5/5        5/5       5/5       5/5          5/5                                                                            Vascular:  + warm well perfused; capillary refill <3 seconds                                                       A/P :  73y Female S/P L2-L5 lami/fusion  POD# 1    -  Pain control  -  DVT ppx: [x]SCDs   [x] Pharmacolgic  ASA 325mg Qd  -  PT and out of bed today  - Spine precautions  -  Weight bearing status: WBAT [X]        PWB    [ ]     TTWB  [ ]      NWB  [ ]  - Monitor drain   - D/c [facility]  -  Dispo: Home vs [facility] ORTHO-SPINE POST-OP PROGRESS NOTE:      [id number]    THI JOSE CARLOS      PROCEDURE: L2- L5 Lumbar Lami Fusion    DOS: 6/13 POD 1      SUBJECTIVE: Patient is a 67y Female Patient seen and examined. Patient reports of moderate discomfort that is controlled by pain medications. Reports participating with PT and walking out to hallway and back. Patient denies of acute sensory or motor changes, HA, N/V, Numbness, tingling.                           9.6    12.41 )-----------( 222      ( 14 Jun 2022 05:42 )             30.3           I&O's Detail    13 Jun 2022 07:01  -  14 Jun 2022 07:00  --------------------------------------------------------  IN:    Lactated Ringers: 560 mL  Total IN: 560 mL    OUT:    Accordian (mL): 410 mL    Indwelling Catheter - Urethral (mL): 2500 mL  Total OUT: 2910 mL    Total NET: -2350 mL          acetaminophen     Tablet .. 975 milliGRAM(s) Oral every 8 hours  aluminum hydroxide/magnesium hydroxide/simethicone Suspension 30 milliLiter(s) Oral every 12 hours PRN  aspirin enteric coated 325 milliGRAM(s) Oral daily  atorvastatin 80 milliGRAM(s) Oral at bedtime  celecoxib 200 milliGRAM(s) Oral every 12 hours  dextrose 5%. 1000 milliLiter(s) IV Continuous <Continuous>  dextrose 5%. 1000 milliLiter(s) IV Continuous <Continuous>  dextrose 50% Injectable 25 Gram(s) IV Push once  dextrose 50% Injectable 12.5 Gram(s) IV Push once  dextrose 50% Injectable 25 Gram(s) IV Push once  dextrose Oral Gel 15 Gram(s) Oral once PRN  DULoxetine 60 milliGRAM(s) Oral daily  glucagon  Injectable 1 milliGRAM(s) IntraMuscular once  HYDROmorphone PCA (1 mG/mL) 30 milliLiter(s) PCA Continuous PCA Continuous  insulin lispro (ADMELOG) corrective regimen sliding scale   SubCutaneous three times a day before meals  lactated ringers. 1000 milliLiter(s) IV Continuous <Continuous>  magnesium hydroxide Suspension 30 milliLiter(s) Oral every 12 hours PRN  methocarbamol 750 milliGRAM(s) Oral every 8 hours  naloxone Injectable 0.1 milliGRAM(s) IV Push every 3 minutes PRN  ondansetron Injectable 4 milliGRAM(s) IV Push every 6 hours PRN  ondansetron Injectable 4 milliGRAM(s) IV Push every 6 hours PRN  ondansetron Injectable 4 milliGRAM(s) IV Push every 6 hours PRN  pantoprazole    Tablet 40 milliGRAM(s) Oral before breakfast  senna 2 Tablet(s) Oral at bedtime        T(C): 36.8 (06-14-22 @ 04:54), Max: 36.8 (06-13-22 @ 22:27)  HR: 86 (06-14-22 @ 04:54) (66 - 124)  BP: 110/63 (06-14-22 @ 04:54) (92/73 - 119/70)  RR: 18 (06-14-22 @ 04:54) (13 - 21)  SpO2: 91% (06-14-22 @ 04:54) (91% - 100%)  Wt(kg): --      PHYSICAL EXAM:     Constitutional: Alert, responsive, in no acute distress.     Spine:                     Dressing:  Clean/dry/intact. No bleeding, staining noted.         Abdomen: Soft, non-tender         Drains:  Drain intact, function well. Blood in canister. Drain output 260 ml over 12 hours         Sensation:          [ ] Upper extremity                      ax        mc           m          u          r                                                         R          +           +             +           +          +                                               L           +           +             +           +          +         [ ] Lower extremity             sp         dp         saph       ortiz         tibial                                                R          +           +             +           +          +                                               L           +           +             +           +          +                      Motor exam:          [ ] Upper extremity                        Bi(c5)  WE(c6)  EE(c7)   FF(c8)                                                R         5/5        5/5        5/5       5/5                                               L          5/5        5/5        5/5       5/5         [ ] Lower extremity                     HF(l2)   KE(l3)    TA(l4)   EHL(l5)  GS(s1)                                                 R        5/5        5/5        5/5       5/5         5/5                                               L         5/5        5/5       5/5       5/5          5/5                                                                            Vascular:  + warm well perfused; capillary refill <3 seconds                                                       A/P :  73y Female S/P L2-L5 lami/fusion  POD# 1    -  Pain control  -  DVT ppx: [x]SCDs   [x] Pharmacolgic  ASA 325mg Qd  -  PT and out of bed today  - Spine precautions  -  Weight bearing status: WBAT [X]        PWB    [ ]     TTWB  [ ]      NWB  [ ]  - Monitor drain   - D/c [facility]  -  Dispo: Home vs [facility]

## 2022-06-15 LAB
ANION GAP SERPL CALC-SCNC: 9 MMOL/L — SIGNIFICANT CHANGE UP (ref 5–17)
BASOPHILS # BLD AUTO: 0.06 K/UL — SIGNIFICANT CHANGE UP (ref 0–0.2)
BASOPHILS NFR BLD AUTO: 0.7 % — SIGNIFICANT CHANGE UP (ref 0–2)
BUN SERPL-MCNC: 12.9 MG/DL — SIGNIFICANT CHANGE UP (ref 8–20)
CALCIUM SERPL-MCNC: 8.1 MG/DL — LOW (ref 8.6–10.2)
CHLORIDE SERPL-SCNC: 106 MMOL/L — SIGNIFICANT CHANGE UP (ref 98–107)
CO2 SERPL-SCNC: 25 MMOL/L — SIGNIFICANT CHANGE UP (ref 22–29)
CREAT SERPL-MCNC: 0.49 MG/DL — LOW (ref 0.5–1.3)
EGFR: 105 ML/MIN/1.73M2 — SIGNIFICANT CHANGE UP
EOSINOPHIL # BLD AUTO: 0.1 K/UL — SIGNIFICANT CHANGE UP (ref 0–0.5)
EOSINOPHIL NFR BLD AUTO: 1.2 % — SIGNIFICANT CHANGE UP (ref 0–6)
GLUCOSE BLDC GLUCOMTR-MCNC: 119 MG/DL — HIGH (ref 70–99)
GLUCOSE BLDC GLUCOMTR-MCNC: 129 MG/DL — HIGH (ref 70–99)
GLUCOSE BLDC GLUCOMTR-MCNC: 134 MG/DL — HIGH (ref 70–99)
GLUCOSE BLDC GLUCOMTR-MCNC: 94 MG/DL — SIGNIFICANT CHANGE UP (ref 70–99)
GLUCOSE SERPL-MCNC: 100 MG/DL — HIGH (ref 70–99)
HCT VFR BLD CALC: 27.3 % — LOW (ref 34.5–45)
HGB BLD-MCNC: 8.7 G/DL — LOW (ref 11.5–15.5)
IMM GRANULOCYTES NFR BLD AUTO: 0.7 % — SIGNIFICANT CHANGE UP (ref 0–1.5)
LYMPHOCYTES # BLD AUTO: 2.1 K/UL — SIGNIFICANT CHANGE UP (ref 1–3.3)
LYMPHOCYTES # BLD AUTO: 24.8 % — SIGNIFICANT CHANGE UP (ref 13–44)
MCHC RBC-ENTMCNC: 31.6 PG — SIGNIFICANT CHANGE UP (ref 27–34)
MCHC RBC-ENTMCNC: 31.9 GM/DL — LOW (ref 32–36)
MCV RBC AUTO: 99.3 FL — SIGNIFICANT CHANGE UP (ref 80–100)
MONOCYTES # BLD AUTO: 0.98 K/UL — HIGH (ref 0–0.9)
MONOCYTES NFR BLD AUTO: 11.6 % — SIGNIFICANT CHANGE UP (ref 2–14)
NEUTROPHILS # BLD AUTO: 5.16 K/UL — SIGNIFICANT CHANGE UP (ref 1.8–7.4)
NEUTROPHILS NFR BLD AUTO: 61 % — SIGNIFICANT CHANGE UP (ref 43–77)
PLATELET # BLD AUTO: 192 K/UL — SIGNIFICANT CHANGE UP (ref 150–400)
POTASSIUM SERPL-MCNC: 4 MMOL/L — SIGNIFICANT CHANGE UP (ref 3.5–5.3)
POTASSIUM SERPL-SCNC: 4 MMOL/L — SIGNIFICANT CHANGE UP (ref 3.5–5.3)
RBC # BLD: 2.75 M/UL — LOW (ref 3.8–5.2)
RBC # FLD: 13.6 % — SIGNIFICANT CHANGE UP (ref 10.3–14.5)
SODIUM SERPL-SCNC: 140 MMOL/L — SIGNIFICANT CHANGE UP (ref 135–145)
WBC # BLD: 8.46 K/UL — SIGNIFICANT CHANGE UP (ref 3.8–10.5)
WBC # FLD AUTO: 8.46 K/UL — SIGNIFICANT CHANGE UP (ref 3.8–10.5)

## 2022-06-15 RX ADMIN — TRAMADOL HYDROCHLORIDE 100 MILLIGRAM(S): 50 TABLET ORAL at 09:51

## 2022-06-15 RX ADMIN — DULOXETINE HYDROCHLORIDE 60 MILLIGRAM(S): 30 CAPSULE, DELAYED RELEASE ORAL at 13:31

## 2022-06-15 RX ADMIN — TRAMADOL HYDROCHLORIDE 100 MILLIGRAM(S): 50 TABLET ORAL at 21:58

## 2022-06-15 RX ADMIN — METHOCARBAMOL 750 MILLIGRAM(S): 500 TABLET, FILM COATED ORAL at 21:58

## 2022-06-15 RX ADMIN — METHOCARBAMOL 750 MILLIGRAM(S): 500 TABLET, FILM COATED ORAL at 13:31

## 2022-06-15 RX ADMIN — Medication 975 MILLIGRAM(S): at 05:51

## 2022-06-15 RX ADMIN — Medication 325 MILLIGRAM(S): at 13:31

## 2022-06-15 RX ADMIN — TRAMADOL HYDROCHLORIDE 100 MILLIGRAM(S): 50 TABLET ORAL at 22:50

## 2022-06-15 RX ADMIN — TRAMADOL HYDROCHLORIDE 100 MILLIGRAM(S): 50 TABLET ORAL at 10:35

## 2022-06-15 RX ADMIN — Medication 975 MILLIGRAM(S): at 21:58

## 2022-06-15 RX ADMIN — Medication 975 MILLIGRAM(S): at 13:31

## 2022-06-15 RX ADMIN — PANTOPRAZOLE SODIUM 40 MILLIGRAM(S): 20 TABLET, DELAYED RELEASE ORAL at 05:53

## 2022-06-15 RX ADMIN — CELECOXIB 200 MILLIGRAM(S): 200 CAPSULE ORAL at 05:52

## 2022-06-15 RX ADMIN — ATORVASTATIN CALCIUM 80 MILLIGRAM(S): 80 TABLET, FILM COATED ORAL at 21:58

## 2022-06-15 RX ADMIN — Medication 975 MILLIGRAM(S): at 14:01

## 2022-06-15 RX ADMIN — SENNA PLUS 2 TABLET(S): 8.6 TABLET ORAL at 21:58

## 2022-06-15 RX ADMIN — CELECOXIB 200 MILLIGRAM(S): 200 CAPSULE ORAL at 18:03

## 2022-06-15 RX ADMIN — CELECOXIB 200 MILLIGRAM(S): 200 CAPSULE ORAL at 05:51

## 2022-06-15 RX ADMIN — CELECOXIB 200 MILLIGRAM(S): 200 CAPSULE ORAL at 17:33

## 2022-06-15 RX ADMIN — MAGNESIUM HYDROXIDE 30 MILLILITER(S): 400 TABLET, CHEWABLE ORAL at 13:36

## 2022-06-15 RX ADMIN — METHOCARBAMOL 750 MILLIGRAM(S): 500 TABLET, FILM COATED ORAL at 05:52

## 2022-06-15 NOTE — DIETITIAN INITIAL EVALUATION ADULT - PERTINENT LABORATORY DATA
06-15    140  |  106  |  12.9  ----------------------------<  100<H>  4.0   |  25.0  |  0.49<L>    Ca    8.1<L>      15 [facility] 2022 07:13    POCT Blood Glucose.: 134 mg/dL (06-15-22 @ 13:33)  A1C with Estimated Average Glucose Result: 5.9 % (06-07-22 @ 13:16)

## 2022-06-15 NOTE — PROGRESS NOTE ADULT - ASSESSMENT
67F  s/p lumbar fusion  chronic opioid tx on tramadol 50 qid per istop    Pain controlled  Pain service will sign off  Please reconsult as needed     can DC dilaudid PO order    bowel regimen as per primary team
Attending statement:  I have personally seen this patient, and formed a face to face diagnostic evaluation on this patient on this date.  I have reviewed the PA, NP and or Medical/PA student and/or Resident documentation and agree with the history, physical exam and plan of care except if noted otherwise.

## 2022-06-15 NOTE — PROGRESS NOTE ADULT - SUBJECTIVE AND OBJECTIVE BOX
Pt Name: THI BRANCH    MRN: [id number]      Patient is s/p lumbar laminectomy and fusion on , POD#2.  Patient ambulating freely in room without any assistive devices. Patient states she is comfortable, pain controlled, no new complaints. Hemovac drain remains in place.       PAST MEDICAL & SURGICAL HISTORY:  PAST MEDICAL & SURGICAL HISTORY:  Spinal stenosis, lumbar region, with neurogenic claudication      Spondylosis without myelopathy or radiculopathy, lumbar region      Hyperlipidemia      History of   x2      History of cholecystectomy      H/O foot surgery          Allergies: No Known Allergies      Medications: acetaminophen     Tablet .. 975 milliGRAM(s) Oral every 8 hours  aluminum hydroxide/magnesium hydroxide/simethicone Suspension 30 milliLiter(s) Oral every 12 hours PRN  aspirin enteric coated 325 milliGRAM(s) Oral daily  atorvastatin 80 milliGRAM(s) Oral at bedtime  celecoxib 200 milliGRAM(s) Oral every 12 hours  dextrose 5%. 1000 milliLiter(s) IV Continuous <Continuous>  dextrose 5%. 1000 milliLiter(s) IV Continuous <Continuous>  dextrose 50% Injectable 25 Gram(s) IV Push once  dextrose 50% Injectable 12.5 Gram(s) IV Push once  dextrose 50% Injectable 25 Gram(s) IV Push once  dextrose Oral Gel 15 Gram(s) Oral once PRN  DULoxetine 60 milliGRAM(s) Oral daily  glucagon  Injectable 1 milliGRAM(s) IntraMuscular once  HYDROmorphone   Tablet 4 milliGRAM(s) Oral every 4 hours PRN  HYDROmorphone  Injectable 0.5 milliGRAM(s) IV Push once PRN  insulin lispro (ADMELOG) corrective regimen sliding scale   SubCutaneous three times a day before meals  lactated ringers. 1000 milliLiter(s) IV Continuous <Continuous>  magnesium hydroxide Suspension 30 milliLiter(s) Oral every 12 hours PRN  methocarbamol 750 milliGRAM(s) Oral every 8 hours  naloxone Injectable 0.1 milliGRAM(s) IV Push every 3 minutes PRN  ondansetron Injectable 4 milliGRAM(s) IV Push every 6 hours PRN  ondansetron Injectable 4 milliGRAM(s) IV Push every 6 hours PRN  ondansetron Injectable 4 milliGRAM(s) IV Push every 6 hours PRN  pantoprazole    Tablet 40 milliGRAM(s) Oral before breakfast  senna 2 Tablet(s) Oral at bedtime  traMADol 50 milliGRAM(s) Oral every 4 hours PRN  traMADol 100 milliGRAM(s) Oral every 6 hours PRN        Ambulation: Walking independently                         8.7    8.46  )-----------( 192      ( 15 [facility] 2022 07:13 )             27.3     06-15    140  |  106  |  12.9  ----------------------------<  100<H>  4.0   |  25.0  |  0.49<L>    Ca    8.1<L>      15 [facility] 2022 07:13        PHYSICAL EXAM:    Vital Signs Last 24 Hrs  T(C): 36.8 (15 [facility] 2022 07:36), Max: 36.9 (2022 16:45)  T(F): 98.2 (15 [facility] 2022 07:36), Max: 98.4 (2022 16:45)  HR: 84 (15 [facility] 2022 07:36) (70 - 88)  BP: 105/68 (15 [facility] 2022 07:36) (101/65 - 119/73)  BP(mean): --  RR: 18 (15 [facility] 2022 07:36) (17 - 18)  SpO2: 96% (15 [facility] 2022 07:36) (93% - 96%)  Daily     Daily     Appearance: Alert, responsive, in no acute distress.    Neurological: Sensation is grossly intact to light touch. Decreased sensation noted to right lower extremity, at thigh. 5/5 motor function of all extremities. No focal deficits or weaknesses found.    Skin: no rash on visible skin. Skin is clean, dry and intact. No bleeding. No abrasions. No ulcerations.    Vascular: 2+ distal pulses. Cap refill < 2 sec. No signs of venous   insufficiency   or stasis. No extremity ulcerations. No cyanosis.    Musculoskeletal:           Left Lower Extremity: Motor and sensory remain intact.  Patient reports decreased sensitivity to right thigh.  Motor remains 5/5     drain output 410 over 24 hours. 240 last  shift.     A/P:  Pt is a  67y Female with L2-L5 fusion    PLAN:   * WBAT  Monitor drain output.  Pain control

## 2022-06-15 NOTE — PROGRESS NOTE ADULT - SUBJECTIVE AND OBJECTIVE BOX
HPI:  Pt 67 years-old female seen today pre-op for Lumbar fusion L2-L5, Interbody fusion, L3-L4, L4-L5 LAMINECTOMY l2-L5. Pt with PMH of HLD, Lumbar stenosis and pain, Polymyalgia Rheumatica, Pt report longstanding lumbar pain for more than 2 years, lumbar pain has progressively worsening within the last 9 months. Pt denies any trauma to site, report pain radiating into the RLE. Pain aggravated by ambulation, standing or sitting for a long period of time, alleviating factors include leaning forward, short period of sitting down, pain medication aleve and tramadol, report occasional tingling and numbness of lumbar region. Pt denies ataxia, no incontinence, good dexterity, no urinary retention, no fever/chills. Pt sen today for a scheduled surgery on 6/13/2022 with Dr. Aguero.   (07 Jun 2022 11:03)     Allergies    No Known Allergies    Intolerances      Spinal stenosis, lumbar region, with neurogenic claudication    Spondylosis without myelopathy or radiculopathy, lumbar region    Hyperlipidemia      MEDICATIONS  (STANDING):  acetaminophen     Tablet .. 975 milliGRAM(s) Oral every 8 hours  aspirin enteric coated 325 milliGRAM(s) Oral daily  atorvastatin 80 milliGRAM(s) Oral at bedtime  celecoxib 200 milliGRAM(s) Oral every 12 hours  dextrose 5%. 1000 milliLiter(s) (50 mL/Hr) IV Continuous <Continuous>  dextrose 5%. 1000 milliLiter(s) (100 mL/Hr) IV Continuous <Continuous>  dextrose 50% Injectable 25 Gram(s) IV Push once  dextrose 50% Injectable 12.5 Gram(s) IV Push once  dextrose 50% Injectable 25 Gram(s) IV Push once  DULoxetine 60 milliGRAM(s) Oral daily  glucagon  Injectable 1 milliGRAM(s) IntraMuscular once  insulin lispro (ADMELOG) corrective regimen sliding scale   SubCutaneous three times a day before meals  lactated ringers. 1000 milliLiter(s) (80 mL/Hr) IV Continuous <Continuous>  methocarbamol 750 milliGRAM(s) Oral every 8 hours  pantoprazole    Tablet 40 milliGRAM(s) Oral before breakfast  senna 2 Tablet(s) Oral at bedtime    MEDICATIONS  (PRN):  aluminum hydroxide/magnesium hydroxide/simethicone Suspension 30 milliLiter(s) Oral every 12 hours PRN Indigestion  dextrose Oral Gel 15 Gram(s) Oral once PRN Blood Glucose LESS THAN 70 milliGRAM(s)/deciliter  magnesium hydroxide Suspension 30 milliLiter(s) Oral every 12 hours PRN Constipation  naloxone Injectable 0.1 milliGRAM(s) IV Push every 3 minutes PRN For ANY of the following changes in patient status:  A. RR LESS THAN 10 breaths per minute, B. Oxygen saturation LESS THAN 90%, C. Sedation score of 6  ondansetron Injectable 4 milliGRAM(s) IV Push every 6 hours PRN Nausea  ondansetron Injectable 4 milliGRAM(s) IV Push every 6 hours PRN Nausea and/or Vomiting  ondansetron Injectable 4 milliGRAM(s) IV Push every 6 hours PRN Nausea and/or Vomiting  traMADol 50 milliGRAM(s) Oral every 4 hours PRN Mild Pain (1 - 3)  traMADol 100 milliGRAM(s) Oral every 6 hours PRN Moderate Pain (4 - 6)                           8.7    8.46  )-----------( 192      ( 15 [facility] 2022 07:13 )             27.3     06-15    140  |  106  |  12.9  ----------------------------<  100<H>  4.0   |  25.0  |  0.49<L>    Ca    8.1<L>      15 [facility] 2022 07:13        ;  Vital Signs Last 24 Hrs  T(C): 36.7 (15 [facility] 2022 17:45), Max: 36.9 (15 [facility] 2022 04:14)  T(F): 98.1 (15 [facility] 2022 17:45), Max: 98.4 (15 [facility] 2022 04:14)  HR: 96 (15 [facility] 2022 17:45) (80 - 96)  BP: 102/68 (15 [facility] 2022 17:45) (101/65 - 107/65)  BP(mean): --  RR: 18 (15 [facility] 2022 17:45) (18 - 18)  SpO2: 95% (15 [facility] 2022 17:45) (93% - 96%)  CAPILLARY BLOOD GLUCOSE      06-14 @ 07:01  -  06-15 @ 07:00  --------------------------------------------------------  IN: 0 mL / OUT: 1810 mL / NET: -1810 mL    06-15 @ 07:01  -  06-15 @ 17:58  --------------------------------------------------------  IN: 0 mL / OUT: 90 mL / NET: -90 mL      Patient feeling better No CP, No SOB, No N/V Mild back pain    HEENT: PEARLA  Neck: Supple  Cardio: S1 S2 1/6 SE Murmur  Pulm: CTA No Rales or Ronchi  Abd: Soft NT ND BS+  Back - Incision bandage clean Drain   Rectal / pelvic Breast - refused  Ext: No DCT  Skin: No Rash  Neuro: Awake Pleasant      Lumbar fusion L2-L5, Interbody fusion, L3-L4, L4-L5 LAMINECTOMY l2-L5 - pain control and ambulation  Post Op Hypotension - IV bolus, cont IV fluids will monitor, secondary to opiates   Acute blood loss anemia - follow Hb mild symptoms   Mild Nausea - zofran prn   Post op Hyperglycemia - diet education will follow   Hyperlipidemia - statin  History of C-sectionx2  History of cholecystectomy  H/O foot surgery

## 2022-06-15 NOTE — DIETITIAN INITIAL EVALUATION ADULT - ORAL INTAKE PTA/DIET HISTORY
Pt reports tolerating meals with good PO intake. She states she started weight watchers recently and is trying to lose weight. Education provided on portion control and HBV protein foods to promote healing.

## 2022-06-15 NOTE — DIETITIAN INITIAL EVALUATION ADULT - PERTINENT MEDS FT
MEDICATIONS  (STANDING):  acetaminophen     Tablet .. 975 milliGRAM(s) Oral every 8 hours  aspirin enteric coated 325 milliGRAM(s) Oral daily  atorvastatin 80 milliGRAM(s) Oral at bedtime  celecoxib 200 milliGRAM(s) Oral every 12 hours  dextrose 5%. 1000 milliLiter(s) (50 mL/Hr) IV Continuous <Continuous>  dextrose 5%. 1000 milliLiter(s) (100 mL/Hr) IV Continuous <Continuous>  dextrose 50% Injectable 25 Gram(s) IV Push once  dextrose 50% Injectable 12.5 Gram(s) IV Push once  dextrose 50% Injectable 25 Gram(s) IV Push once  DULoxetine 60 milliGRAM(s) Oral daily  glucagon  Injectable 1 milliGRAM(s) IntraMuscular once  insulin lispro (ADMELOG) corrective regimen sliding scale   SubCutaneous three times a day before meals  lactated ringers. 1000 milliLiter(s) (80 mL/Hr) IV Continuous <Continuous>  methocarbamol 750 milliGRAM(s) Oral every 8 hours  pantoprazole    Tablet 40 milliGRAM(s) Oral before breakfast  senna 2 Tablet(s) Oral at bedtime    MEDICATIONS  (PRN):  aluminum hydroxide/magnesium hydroxide/simethicone Suspension 30 milliLiter(s) Oral every 12 hours PRN Indigestion  dextrose Oral Gel 15 Gram(s) Oral once PRN Blood Glucose LESS THAN 70 milliGRAM(s)/deciliter  magnesium hydroxide Suspension 30 milliLiter(s) Oral every 12 hours PRN Constipation  naloxone Injectable 0.1 milliGRAM(s) IV Push every 3 minutes PRN For ANY of the following changes in patient status:  A. RR LESS THAN 10 breaths per minute, B. Oxygen saturation LESS THAN 90%, C. Sedation score of 6  ondansetron Injectable 4 milliGRAM(s) IV Push every 6 hours PRN Nausea  ondansetron Injectable 4 milliGRAM(s) IV Push every 6 hours PRN Nausea and/or Vomiting  ondansetron Injectable 4 milliGRAM(s) IV Push every 6 hours PRN Nausea and/or Vomiting  traMADol 50 milliGRAM(s) Oral every 4 hours PRN Mild Pain (1 - 3)  traMADol 100 milliGRAM(s) Oral every 6 hours PRN Moderate Pain (4 - 6)

## 2022-06-15 NOTE — PROGRESS NOTE ADULT - SUBJECTIVE AND OBJECTIVE BOX
Interval Hx:  Patient seen during rounds  Patient reports pain to be controlled on current medications  Patient denies sedation with medications   c/o constipation, MoM ordered by team    Analgesic Dosing for past 24 hours reviewed as below:  acetaminophen     Tablet ..   975 milliGRAM(s) Oral (06-15-22 @ 05:51)   975 milliGRAM(s) Oral (06-14-22 @ 22:46)   975 milliGRAM(s) Oral (06-14-22 @ 14:42)    celecoxib   200 milliGRAM(s) Oral (06-15-22 @ 05:52)   200 milliGRAM(s) Oral (06-14-22 @ 17:33)    HYDROmorphone   Tablet   4 milliGRAM(s) Oral (06-14-22 @ 22:46)    methocarbamol   750 milliGRAM(s) Oral (06-15-22 @ 05:52)   750 milliGRAM(s) Oral (06-14-22 @ 22:46)   750 milliGRAM(s) Oral (06-14-22 @ 14:46)    traMADol   100 milliGRAM(s) Oral (06-15-22 @ 09:51)   100 milliGRAM(s) Oral (06-14-22 @ 17:33)          T(C): 36.8 (06-15-22 @ 07:36), Max: 36.9 (06-14-22 @ 16:45)  HR: 84 (06-15-22 @ 07:36) (74 - 88)  BP: 105/68 (06-15-22 @ 07:36) (101/65 - 119/73)  RR: 18 (06-15-22 @ 07:36) (17 - 18)  SpO2: 96% (06-15-22 @ 07:36) (93% - 96%)      06-14-22 @ 07:01  -  06-15-22 @ 07:00  --------------------------------------------------------  IN: 0 mL / OUT: 1810 mL / NET: -1810 mL        acetaminophen     Tablet .. 975 milliGRAM(s) Oral every 8 hours  aluminum hydroxide/magnesium hydroxide/simethicone Suspension 30 milliLiter(s) Oral every 12 hours PRN  aspirin enteric coated 325 milliGRAM(s) Oral daily  atorvastatin 80 milliGRAM(s) Oral at bedtime  celecoxib 200 milliGRAM(s) Oral every 12 hours  dextrose 5%. 1000 milliLiter(s) IV Continuous <Continuous>  dextrose 5%. 1000 milliLiter(s) IV Continuous <Continuous>  dextrose 50% Injectable 25 Gram(s) IV Push once  dextrose 50% Injectable 12.5 Gram(s) IV Push once  dextrose 50% Injectable 25 Gram(s) IV Push once  dextrose Oral Gel 15 Gram(s) Oral once PRN  DULoxetine 60 milliGRAM(s) Oral daily  glucagon  Injectable 1 milliGRAM(s) IntraMuscular once  HYDROmorphone   Tablet 4 milliGRAM(s) Oral every 4 hours PRN  HYDROmorphone  Injectable 0.5 milliGRAM(s) IV Push once PRN  insulin lispro (ADMELOG) corrective regimen sliding scale   SubCutaneous three times a day before meals  lactated ringers. 1000 milliLiter(s) IV Continuous <Continuous>  magnesium hydroxide Suspension 30 milliLiter(s) Oral every 12 hours PRN  methocarbamol 750 milliGRAM(s) Oral every 8 hours  naloxone Injectable 0.1 milliGRAM(s) IV Push every 3 minutes PRN  ondansetron Injectable 4 milliGRAM(s) IV Push every 6 hours PRN  ondansetron Injectable 4 milliGRAM(s) IV Push every 6 hours PRN  ondansetron Injectable 4 milliGRAM(s) IV Push every 6 hours PRN  pantoprazole    Tablet 40 milliGRAM(s) Oral before breakfast  senna 2 Tablet(s) Oral at bedtime  traMADol 50 milliGRAM(s) Oral every 4 hours PRN  traMADol 100 milliGRAM(s) Oral every 6 hours PRN                          8.7    8.46  )-----------( 192      ( 15 [facility] 2022 07:13 )             27.3     06-15    140  |  106  |  12.9  ----------------------------<  100<H>  4.0   |  25.0  |  0.49<L>    Ca    8.1<L>      15 [facility] 2022 07:13            Pain Service   [phone]

## 2022-06-16 ENCOUNTER — TRANSCRIPTION ENCOUNTER (OUTPATIENT)
Age: 67
End: 2022-06-16

## 2022-06-16 VITALS
SYSTOLIC BLOOD PRESSURE: 109 MMHG | HEART RATE: 87 BPM | OXYGEN SATURATION: 96 % | TEMPERATURE: 98 F | DIASTOLIC BLOOD PRESSURE: 73 MMHG | RESPIRATION RATE: 18 BRPM

## 2022-06-16 LAB
ANION GAP SERPL CALC-SCNC: 7 MMOL/L — SIGNIFICANT CHANGE UP (ref 5–17)
BASOPHILS # BLD AUTO: 0.07 K/UL — SIGNIFICANT CHANGE UP (ref 0–0.2)
BASOPHILS NFR BLD AUTO: 0.8 % — SIGNIFICANT CHANGE UP (ref 0–2)
BUN SERPL-MCNC: 10.3 MG/DL — SIGNIFICANT CHANGE UP (ref 8–20)
CALCIUM SERPL-MCNC: 8.4 MG/DL — LOW (ref 8.6–10.2)
CHLORIDE SERPL-SCNC: 105 MMOL/L — SIGNIFICANT CHANGE UP (ref 98–107)
CO2 SERPL-SCNC: 26 MMOL/L — SIGNIFICANT CHANGE UP (ref 22–29)
CREAT SERPL-MCNC: 0.48 MG/DL — LOW (ref 0.5–1.3)
EGFR: 105 ML/MIN/1.73M2 — SIGNIFICANT CHANGE UP
EOSINOPHIL # BLD AUTO: 0.22 K/UL — SIGNIFICANT CHANGE UP (ref 0–0.5)
EOSINOPHIL NFR BLD AUTO: 2.6 % — SIGNIFICANT CHANGE UP (ref 0–6)
GLUCOSE BLDC GLUCOMTR-MCNC: 122 MG/DL — HIGH (ref 70–99)
GLUCOSE BLDC GLUCOMTR-MCNC: 91 MG/DL — SIGNIFICANT CHANGE UP (ref 70–99)
GLUCOSE SERPL-MCNC: 101 MG/DL — HIGH (ref 70–99)
HCT VFR BLD CALC: 28.8 % — LOW (ref 34.5–45)
HGB BLD-MCNC: 9.1 G/DL — LOW (ref 11.5–15.5)
IMM GRANULOCYTES NFR BLD AUTO: 0.5 % — SIGNIFICANT CHANGE UP (ref 0–1.5)
LYMPHOCYTES # BLD AUTO: 2.14 K/UL — SIGNIFICANT CHANGE UP (ref 1–3.3)
LYMPHOCYTES # BLD AUTO: 25.3 % — SIGNIFICANT CHANGE UP (ref 13–44)
MCHC RBC-ENTMCNC: 31.4 PG — SIGNIFICANT CHANGE UP (ref 27–34)
MCHC RBC-ENTMCNC: 31.6 GM/DL — LOW (ref 32–36)
MCV RBC AUTO: 99.3 FL — SIGNIFICANT CHANGE UP (ref 80–100)
MONOCYTES # BLD AUTO: 0.98 K/UL — HIGH (ref 0–0.9)
MONOCYTES NFR BLD AUTO: 11.6 % — SIGNIFICANT CHANGE UP (ref 2–14)
NEUTROPHILS # BLD AUTO: 5.02 K/UL — SIGNIFICANT CHANGE UP (ref 1.8–7.4)
NEUTROPHILS NFR BLD AUTO: 59.2 % — SIGNIFICANT CHANGE UP (ref 43–77)
PLATELET # BLD AUTO: 214 K/UL — SIGNIFICANT CHANGE UP (ref 150–400)
POTASSIUM SERPL-MCNC: 4.1 MMOL/L — SIGNIFICANT CHANGE UP (ref 3.5–5.3)
POTASSIUM SERPL-SCNC: 4.1 MMOL/L — SIGNIFICANT CHANGE UP (ref 3.5–5.3)
RBC # BLD: 2.9 M/UL — LOW (ref 3.8–5.2)
RBC # FLD: 13.4 % — SIGNIFICANT CHANGE UP (ref 10.3–14.5)
SODIUM SERPL-SCNC: 138 MMOL/L — SIGNIFICANT CHANGE UP (ref 135–145)
WBC # BLD: 8.47 K/UL — SIGNIFICANT CHANGE UP (ref 3.8–10.5)
WBC # FLD AUTO: 8.47 K/UL — SIGNIFICANT CHANGE UP (ref 3.8–10.5)

## 2022-06-16 PROCEDURE — 85027 COMPLETE CBC AUTOMATED: CPT

## 2022-06-16 PROCEDURE — 85025 COMPLETE CBC W/AUTO DIFF WBC: CPT

## 2022-06-16 PROCEDURE — 80048 BASIC METABOLIC PNL TOTAL CA: CPT

## 2022-06-16 PROCEDURE — U0003: CPT

## 2022-06-16 PROCEDURE — 76000 FLUOROSCOPY <1 HR PHYS/QHP: CPT

## 2022-06-16 PROCEDURE — 97163 PT EVAL HIGH COMPLEX 45 MIN: CPT

## 2022-06-16 PROCEDURE — U0005: CPT

## 2022-06-16 PROCEDURE — 82962 GLUCOSE BLOOD TEST: CPT

## 2022-06-16 PROCEDURE — C1713: CPT

## 2022-06-16 PROCEDURE — C1889: CPT

## 2022-06-16 PROCEDURE — 36415 COLL VENOUS BLD VENIPUNCTURE: CPT

## 2022-06-16 RX ORDER — METHOCARBAMOL 500 MG/1
1 TABLET, FILM COATED ORAL
Qty: 30 | Refills: 0
Start: 2022-06-16 | End: 2022-06-25

## 2022-06-16 RX ORDER — TRAMADOL HYDROCHLORIDE 50 MG/1
0 TABLET ORAL
Qty: 0 | Refills: 0 | DISCHARGE

## 2022-06-16 RX ORDER — SENNOSIDES/DOCUSATE SODIUM 8.6MG-50MG
2 TABLET ORAL
Qty: 20 | Refills: 0
Start: 2022-06-16 | End: 2022-06-25

## 2022-06-16 RX ORDER — DULOXETINE HYDROCHLORIDE 30 MG/1
1 CAPSULE, DELAYED RELEASE ORAL
Qty: 0 | Refills: 0 | DISCHARGE
Start: 2022-06-16

## 2022-06-16 RX ORDER — ASPIRIN/CALCIUM CARB/MAGNESIUM 324 MG
1 TABLET ORAL
Qty: 28 | Refills: 0
Start: 2022-06-16 | End: 2022-07-13

## 2022-06-16 RX ORDER — DULOXETINE HYDROCHLORIDE 30 MG/1
0 CAPSULE, DELAYED RELEASE ORAL
Qty: 0 | Refills: 0 | DISCHARGE

## 2022-06-16 RX ORDER — ACETAMINOPHEN 500 MG
2 TABLET ORAL
Qty: 80 | Refills: 0
Start: 2022-06-16 | End: 2022-06-25

## 2022-06-16 RX ORDER — TRAMADOL HYDROCHLORIDE 50 MG/1
1 TABLET ORAL
Qty: 28 | Refills: 0
Start: 2022-06-16 | End: 2022-06-22

## 2022-06-16 RX ADMIN — Medication 325 MILLIGRAM(S): at 13:02

## 2022-06-16 RX ADMIN — Medication 975 MILLIGRAM(S): at 13:02

## 2022-06-16 RX ADMIN — DULOXETINE HYDROCHLORIDE 60 MILLIGRAM(S): 30 CAPSULE, DELAYED RELEASE ORAL at 13:02

## 2022-06-16 RX ADMIN — CELECOXIB 200 MILLIGRAM(S): 200 CAPSULE ORAL at 06:25

## 2022-06-16 RX ADMIN — METHOCARBAMOL 750 MILLIGRAM(S): 500 TABLET, FILM COATED ORAL at 13:02

## 2022-06-16 RX ADMIN — Medication 975 MILLIGRAM(S): at 06:25

## 2022-06-16 RX ADMIN — METHOCARBAMOL 750 MILLIGRAM(S): 500 TABLET, FILM COATED ORAL at 06:26

## 2022-06-16 RX ADMIN — CELECOXIB 200 MILLIGRAM(S): 200 CAPSULE ORAL at 06:26

## 2022-06-16 RX ADMIN — PANTOPRAZOLE SODIUM 40 MILLIGRAM(S): 20 TABLET, DELAYED RELEASE ORAL at 06:25

## 2022-06-16 NOTE — PROGRESS NOTE ADULT - SUBJECTIVE AND OBJECTIVE BOX
ORTHO-SPINE PROGRESS NOTE:    Pt Name: THI BRANCH    MRN: [id number]      Patient is a 67 F being followed s/p L2-5 laminectomy and fusion. Patient is now POD #3. No acute events reported overnight. Patient was seen and evaluated at bedside this AM. Patient is doing well today. Pain is controlled and lower extremity radicular symptoms are improved post-op. Patient has voided post-op and is tolerating diet. PT participation is limited as patient has had recent COVID exposure and was put in isolation. No acute orthopedic complaints are expressed at this time. Denies fever, chills, malaise, calf pain, numbness, or weakness.       PAST MEDICAL & SURGICAL HISTORY:  Spinal stenosis, lumbar region, with neurogenic claudication      Spondylosis without myelopathy or radiculopathy, lumbar region      Hyperlipidemia      History of   x2      History of cholecystectomy      H/O foot surgery          Allergies: No Known Allergies      Medications: acetaminophen     Tablet .. 975 milliGRAM(s) Oral every 8 hours  aluminum hydroxide/magnesium hydroxide/simethicone Suspension 30 milliLiter(s) Oral every 12 hours PRN  aspirin enteric coated 325 milliGRAM(s) Oral daily  atorvastatin 80 milliGRAM(s) Oral at bedtime  celecoxib 200 milliGRAM(s) Oral every 12 hours  dextrose 5%. 1000 milliLiter(s) IV Continuous <Continuous>  dextrose 5%. 1000 milliLiter(s) IV Continuous <Continuous>  dextrose 50% Injectable 25 Gram(s) IV Push once  dextrose 50% Injectable 12.5 Gram(s) IV Push once  dextrose 50% Injectable 25 Gram(s) IV Push once  dextrose Oral Gel 15 Gram(s) Oral once PRN  DULoxetine 60 milliGRAM(s) Oral daily  glucagon  Injectable 1 milliGRAM(s) IntraMuscular once  insulin lispro (ADMELOG) corrective regimen sliding scale   SubCutaneous three times a day before meals  lactated ringers. 1000 milliLiter(s) IV Continuous <Continuous>  magnesium hydroxide Suspension 30 milliLiter(s) Oral every 12 hours PRN  methocarbamol 750 milliGRAM(s) Oral every 8 hours  naloxone Injectable 0.1 milliGRAM(s) IV Push every 3 minutes PRN  ondansetron Injectable 4 milliGRAM(s) IV Push every 6 hours PRN  ondansetron Injectable 4 milliGRAM(s) IV Push every 6 hours PRN  ondansetron Injectable 4 milliGRAM(s) IV Push every 6 hours PRN  pantoprazole    Tablet 40 milliGRAM(s) Oral before breakfast  senna 2 Tablet(s) Oral at bedtime  traMADol 50 milliGRAM(s) Oral every 4 hours PRN  traMADol 100 milliGRAM(s) Oral every 6 hours PRN                            9.1    8.47  )-----------( 214      ( 2022 07:35 )             28.8     06-16    138  |  105  |  10.3  ----------------------------<  101<H>  4.1   |  26.0  |  0.48<L>    Ca    8.4<L>      2022 07:35        PHYSICAL EXAM:    Vital Signs Last 24 Hrs  T(C): 36.7 (2022 07:38), Max: 36.9 (2022 00:48)  T(F): 98 (2022 07:38), Max: 98.4 (2022 00:48)  HR: 87 (2022 07:38) (79 - 96)  BP: 109/73 (2022 07:38) (102/68 - 114/75)  BP(mean): --  RR: 18 (2022 07:38) (18 - 18)  SpO2: 96% (2022 07:38) (94% - 98%)    Appearance: Alert, responsive, in no acute distress.    Skin: no rash on visible skin. Skin is clean, dry and intact. No bleeding. No abrasions. No ulcerations.    Musculoskeletal: Skin warm and intact. Dressings clean and dry. HMV drain in place to self suction. Incision site appears benign. No erythema or drainage. Appropriate TTP of surgical site.        Left Lower Extremity: Patient spontaneously moves LEs. SILT L2-S1. HF/KE/TA/EHL/GSC intact. Calf supple/nontender.        Right Lower Extremity: Patient spontaneously moves LEs. SILT L2-S1. HF/KE/TA/EHL/GSC intact. Calf supple/nontender.       A/P: Pt is a 67y Female POD #3 s/p lumbar laminectomy and fusion.      PLAN:   1. Pain control.  2. WBAT.  3. PT.  4. ASA for DVT PPx.  5. Incentive marsha.  6. HMV drain removed.   7. Diet/GI PPx.  8. Dressing changed.  9. Dispo planning.

## 2022-06-16 NOTE — DISCHARGE NOTE NURSING/CASE MANAGEMENT/SOCIAL WORK - FLU SEASON?
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
Yes...

## 2022-06-16 NOTE — DISCHARGE NOTE NURSING/CASE MANAGEMENT/SOCIAL WORK - PATIENT PORTAL LINK FT
You can access the FollowMyHealth Patient Portal offered by [facility] by registering at the following website: [email or web address]. By joining [facility]’s FollowMyHealth portal, you will also be able to view your health information using other applications (apps) compatible with our system.

## 2022-06-16 NOTE — DISCHARGE NOTE NURSING/CASE MANAGEMENT/SOCIAL WORK - NSDCPEFALRISK_GEN_ALL_CORE
For information on Fall & Injury Prevention, visit: [email or web address]  [email or web address] OR  https://www.cdc.gov/steadi/patient.html

## 2022-06-21 ENCOUNTER — APPOINTMENT (OUTPATIENT)
Dept: ORTHOPEDIC SURGERY | Facility: CLINIC | Age: 67
End: 2022-06-21
Payer: MEDICARE

## 2022-06-21 PROCEDURE — 99024 POSTOP FOLLOW-UP VISIT: CPT

## 2022-06-21 PROCEDURE — 72100 X-RAY EXAM L-S SPINE 2/3 VWS: CPT

## 2022-06-21 NOTE — HISTORY OF PRESENT ILLNESS
[de-identified] : Status post lumbar fusion [de-identified] :  patient is 1 week status post a 3 level spinal fusion with interbody's overall she states she can feel the difference preoperatively and postoperatively she feels her mechanically orientated low back pain are much improved. [de-identified] :   Incision cleansed with ChloraPrep new dressing was applied.  There is no focal sensory and/or motor deficits at this point in time she transitions from seated to standing and vice versa with ease she states her back pain is significantly improved compared to preop she is accompanied by her  [de-identified] : X-rays reviewed that shows 3 levels of interbody fusion implants in pedicle screws with associated rods appear to be well positioned this is a 2 levels x-ray/AP and lateral x-ray taken on June 21, 2022 [de-identified] :  status post lumbar fusion [de-identified] :  patient is going to follow-up in 3 weeks we have discussed guarded activity at this point in time no repetitive bending lifting pulling twisting no lifting greater than 10 to 15 pounds.  Refills of her medications will be completed

## 2022-07-12 ENCOUNTER — APPOINTMENT (OUTPATIENT)
Dept: ORTHOPEDIC SURGERY | Facility: CLINIC | Age: 67
End: 2022-07-12

## 2022-07-12 PROCEDURE — 72100 X-RAY EXAM L-S SPINE 2/3 VWS: CPT

## 2022-07-12 PROCEDURE — 99024 POSTOP FOLLOW-UP VISIT: CPT

## 2022-07-12 NOTE — HISTORY OF PRESENT ILLNESS
[de-identified] : lumbar fusion DOS 6/13/22 [de-identified] : Patient is approximately 1 month status post lumbar interbody fusion for 3 levels she states her sciatic and neurogenic claudication is nearly completely resolved.  Does have some myositis type pain greater trochanteric type discomfort. [de-identified] :   Physical exam is consistent with a right greater trochanteric bursitis right piriformis type etiology.  Incision is well-healed no acute motor and/or sensory deficits [de-identified] : X-rays have been reviewed that demonstrates a 3 level interbody fusion well-positioned and stable when compared to previous x-rays [de-identified] :  status post 3 level interbody fusion [de-identified] :  patient is going to enroll in physical therapy Home exercises have been discussed and demonstrated patient may swim lifting restrictions of 10 to 15 pounds.  Follow-up in 1 month

## 2022-08-09 ENCOUNTER — APPOINTMENT (OUTPATIENT)
Dept: ORTHOPEDIC SURGERY | Facility: CLINIC | Age: 67
End: 2022-08-09

## 2022-08-09 PROCEDURE — 99024 POSTOP FOLLOW-UP VISIT: CPT

## 2022-08-09 PROCEDURE — 72100 X-RAY EXAM L-S SPINE 2/3 VWS: CPT

## 2022-08-09 NOTE — HISTORY OF PRESENT ILLNESS
[de-identified] : Lumbar instrumented fusion with pedicle screws at L2, L3, L4, L5.\par  Interbody arthrodesis with interbody cage placement at L2-3, L3-4, L4-5.\par  Lumbar laminectomy in a Hardentype fashion at L2, L3, L4 \par  Posterolateral fusion at motion segments 23, 34, 45.\par  Isolated lumbar laminectomy with partial facetectomy and foraminotomy, L5\par DOS: 06/13/2022 [de-identified] : 2 month postop visit.  Patient states she is doing very well she is returning to the beach activities are improving.  States her back pain is more myositis in response to physical modalities.  Again she states her sciatic type pain is essentially resolved [de-identified] : Incision is well-healed no acute neurologic findings or motor deficits distally [de-identified] : X-rays of the lumbar spine have been reviewed that demonstrates 3 levels of interbody fusion. [de-identified] : Status post lumbar fusion [de-identified] : Continue with physical therapy she is greater than 6 weeks out from her lumbar fusion I think it is reasonable to use intermittent Aleve patient is going to return to beach activity walking in the sand she also going to start going to the gym for very light exercises we have provided a weight limit of approximately 10-15 at max approximately 20 pounds of lifting nothing repetitive.  Patient is going to follow-up in 2 months

## 2022-09-19 ENCOUNTER — APPOINTMENT (OUTPATIENT)
Dept: ORTHOPEDIC SURGERY | Facility: CLINIC | Age: 67
End: 2022-09-19

## 2022-10-20 ENCOUNTER — NON-APPOINTMENT (OUTPATIENT)
Age: 67
End: 2022-10-20

## 2022-10-21 ENCOUNTER — NON-APPOINTMENT (OUTPATIENT)
Age: 67
End: 2022-10-21

## 2022-10-27 ENCOUNTER — APPOINTMENT (OUTPATIENT)
Dept: ORTHOPEDIC SURGERY | Facility: CLINIC | Age: 67
End: 2022-10-27

## 2022-10-27 VITALS
WEIGHT: 180 LBS | HEIGHT: 65 IN | HEART RATE: 97 BPM | SYSTOLIC BLOOD PRESSURE: 139 MMHG | BODY MASS INDEX: 29.99 KG/M2 | TEMPERATURE: 98.1 F | DIASTOLIC BLOOD PRESSURE: 86 MMHG

## 2022-10-27 PROCEDURE — 20552 NJX 1/MLT TRIGGER POINT 1/2: CPT

## 2022-10-27 PROCEDURE — 72100 X-RAY EXAM L-S SPINE 2/3 VWS: CPT

## 2022-10-27 PROCEDURE — 99213 OFFICE O/P EST LOW 20 MIN: CPT | Mod: 25

## 2022-10-27 NOTE — PHYSICAL EXAM
[Antalgic] : antalgic [de-identified] : CONSTITUTIONAL: The patient is a very pleasant individual who is well-nourished and who appears stated age.\par PSYCHIATRIC: The patient is alert and oriented X 3 and in no apparent distress, and participates with orthopedic evaluation well.\par HEAD: Atraumatic and is nonsyndromic in appearance.\par EENT: No visible thyromegaly, EOMI.\par RESPIRATORY: Respiratory rate is regular, not dyspneic on examination.\par LYMPHATICS: There is no inguinal lymphadenopathy\par INTEGUMENTARY: Skin is clean, dry, and intact about the bilateral lower extremities and lumbar spine.\par VASCULAR: There is brisk capillary refill about the bilateral lower extremities.\par NEUROLOGIC: There are no pathologic reflexes. There is no decrease in sensation of the bilateral lower extremities on Wartenberg pinwheel examination. Deep tendon reflexes are well maintained at 2+/4 of the bilateral lower extremities and are symmetric..\par MUSCULOSKELETAL: There is no visible muscular atrophy. Manual motor strength is well maintained in the bilateral lower extremities. Range of motion of lumbar spine is well maintained. The patient ambulates in a non-myelopathic manner. Negative tension sign and straight leg raise bilaterally. Quad extension, ankle dorsiflexion, EHL, plantar flexion, and ankle eversion are well preserved. Normal secondary orthopaedic exam of bilateral hips, greater trochanteric area, knees and ankles, conically orientated low back pain.  Trigger point findings bilaterally [de-identified] : X-rays been reviewed the lumbar spine that shows 3 level instrumented fusion with interbody's [id number] as well.  Implants are unchanged however there appears to be acute compression fracture at T12.  Date of this x-ray is October 27, 2022

## 2022-10-27 NOTE — PROCEDURE
[de-identified] : Verbal consent was obtained and all questions, comments and concerns were addressed.  After right lower lumbar trigger point in the affected area into superficial muscular trigger point was cleansed with alcohol prep X 3, ethyl chloride was used as local anesthetic.  Under sterile precautions 1cc of 1 percent lidocaine without epinephrine, plus 10 mg depomedrol, were instilled into the affected trigger points.  Patient tolerated procedure well. Dry sterile dressing was placed and patient described relief of pain 5 minutes after procedure was performed.

## 2022-10-27 NOTE — REASON FOR VISIT
[Follow-Up Visit] : a follow-up visit for [Spouse] : spouse [FreeTextEntry2] : back pain from recent fall.

## 2022-10-27 NOTE — HISTORY OF PRESENT ILLNESS
[Improving] : improving [10] : a maximum pain level of 10/10 [Bending] : worsened by bending [Lifting] : worsened by lifting [Rest] : relieved by rest [de-identified] : Patient presents for an acute follow-up given a fall she had 1 week ago while in the shower she went to a walk-in clinic prescribed Medrol Dosepak minimal relief.  Overall she states she is slowly improving accompanied by her  [Ataxia] : no ataxia [Incontinence] : no incontinence [Loss of Dexterity] : good dexterity [Urinary Ret.] : no urinary retention

## 2022-10-27 NOTE — DISCUSSION/SUMMARY
[de-identified] : Toradol can be prescribed for this patient I think this will help control her pain that is from this acute fall implants are unchanged with regard to this T12 suspected compression fracture she is slowly improving I would treat this conservatively at this point in time patient is can have a tentative appointment for November 1 or within 3 weeks if her clinical signs and symptoms are unchanged.  Trigger point was tolerated well pain appears to be more bilateral myositis in the lumbar spine therefore I will be treating this suspected compression fracture T12 conservatively.

## 2022-11-13 DIAGNOSIS — R00.0 TACHYCARDIA, UNSPECIFIED: ICD-10-CM

## 2022-11-14 ENCOUNTER — APPOINTMENT (OUTPATIENT)
Dept: CARDIOLOGY | Facility: CLINIC | Age: 67
End: 2022-11-14

## 2022-11-14 ENCOUNTER — NON-APPOINTMENT (OUTPATIENT)
Age: 67
End: 2022-11-14

## 2022-11-14 VITALS
DIASTOLIC BLOOD PRESSURE: 72 MMHG | HEART RATE: 117 BPM | TEMPERATURE: 98.6 F | BODY MASS INDEX: 34.85 KG/M2 | WEIGHT: 177.5 LBS | OXYGEN SATURATION: 96 % | SYSTOLIC BLOOD PRESSURE: 124 MMHG | HEIGHT: 60 IN

## 2022-11-14 DIAGNOSIS — R00.0 TACHYCARDIA, UNSPECIFIED: ICD-10-CM

## 2022-11-14 DIAGNOSIS — G89.29 DORSALGIA, UNSPECIFIED: ICD-10-CM

## 2022-11-14 DIAGNOSIS — M54.9 DORSALGIA, UNSPECIFIED: ICD-10-CM

## 2022-11-14 PROCEDURE — 93242 EXT ECG>48HR<7D RECORDING: CPT | Mod: 59

## 2022-11-14 PROCEDURE — 99204 OFFICE O/P NEW MOD 45 MIN: CPT

## 2022-11-14 PROCEDURE — 93000 ELECTROCARDIOGRAM COMPLETE: CPT | Mod: 59

## 2022-11-14 RX ORDER — PHENTERMINE HYDROCHLORIDE 15 MG/1
15 CAPSULE ORAL
Qty: 21 | Refills: 0 | Status: DISCONTINUED | COMMUNITY
Start: 2022-05-03 | End: 2022-11-14

## 2022-11-14 RX ORDER — ASPIRIN 325 MG/1
325 TABLET ORAL
Qty: 28 | Refills: 0 | Status: DISCONTINUED | COMMUNITY
Start: 2022-06-16 | End: 2022-11-14

## 2022-11-14 RX ORDER — KETOROLAC TROMETHAMINE 10 MG/1
10 TABLET, FILM COATED ORAL 3 TIMES DAILY
Qty: 15 | Refills: 0 | Status: DISCONTINUED | COMMUNITY
Start: 2022-10-27 | End: 2022-11-14

## 2022-11-14 NOTE — CARDIOLOGY SUMMARY
[de-identified] : outside TTE 4/2022: LV EF 63%, mild LVH, mild MR [de-identified] : outside carotid 5/2022 mild atherosclerosis

## 2022-11-14 NOTE — DISCUSSION/SUMMARY
[FreeTextEntry1] : 67F h/o carotid artery sclerosis, HLD, polymyalgia rheumatica, chronic back pain with T-12 compression fracture, lumbar stenosis s/p spinal fusion 6/2022, presents for cardiology evaluation. \par \par Suspect resting sinus tachycardia likely driven by chronic back pain and deconditioning, will obtain 72hrs Holter monitor; EKG no ischemic abnormality and no anginal complains, defer stress testing for now. \par \par \par 1. Resting sinus tachycardia- await 72hrs Hotler with symptoms diary. Consider repeat TTE if any arrhythmia. \par \par 2. HLD- on rosuvatatin\par \par 3. Chronic back pain- follow up with pain management. \par \par 4. Obesity- need dieting and weight loss\par \par \par Follow up in 6 months if Holter is normal.

## 2022-11-14 NOTE — HISTORY OF PRESENT ILLNESS
[FreeTextEntry1] : 67F h/o carotid artery sclerosis, HLD, polymyalgia rheumatica, chronic back pain with T-12 compression fracture, lumbar stenosis s/p spinal fusion 6/2022, presents for cardiology evaluation. \par \par Reports has noticing her resting HR can be up >100-110s since her back surgery, and minimal activities would cause her HR to increase. Been bother by chronic back pain and had recent mechanical fall to her back. Was on prednisone for 7 months for PMR since discontinued, pending repeat . Denies chest pain or shortness of breath, walking 6-7K steps and cannot exercise due to back pain. \par \par TSH in 2.4 in 6/2022 \par \par She had outside carotid and Echo done few months ago. \par \par Mother with CAD/CABG age 70s\par Former smoker quit >40yrs, social alcohol

## 2022-11-17 ENCOUNTER — APPOINTMENT (OUTPATIENT)
Dept: ORTHOPEDIC SURGERY | Facility: CLINIC | Age: 67
End: 2022-11-17
Payer: MEDICARE

## 2022-11-17 VITALS
DIASTOLIC BLOOD PRESSURE: 94 MMHG | HEART RATE: 101 BPM | BODY MASS INDEX: 34.75 KG/M2 | HEIGHT: 60 IN | SYSTOLIC BLOOD PRESSURE: 131 MMHG | WEIGHT: 177 LBS

## 2022-11-17 DIAGNOSIS — S32.000A WEDGE COMPRESSION FRACTURE OF UNSPECIFIED LUMBAR VERTEBRA, INITIAL ENCOUNTER FOR CLOSED FRACTURE: ICD-10-CM

## 2022-11-17 PROCEDURE — 20552 NJX 1/MLT TRIGGER POINT 1/2: CPT

## 2022-11-17 PROCEDURE — 99214 OFFICE O/P EST MOD 30 MIN: CPT | Mod: 24,25

## 2022-11-17 PROCEDURE — 72100 X-RAY EXAM L-S SPINE 2/3 VWS: CPT

## 2022-11-17 PROCEDURE — 22310 CLOSED TX VERT FX W/O MANJ: CPT

## 2022-11-17 NOTE — PHYSICAL EXAM
[de-identified] : CONSTITUTIONAL: The patient is a very pleasant individual who is well-nourished and who appears stated age.\par PSYCHIATRIC: The patient is alert and oriented X 3 and in no apparent distress, and participates with orthopedic evaluation well.\par HEAD: Atraumatic and is nonsyndromic in appearance.\par EENT: No visible thyromegaly, EOMI.\par RESPIRATORY: Respiratory rate is regular, not dyspneic on examination.\par LYMPHATICS: There is no inguinal lymphadenopathy\par INTEGUMENTARY: Skin is clean, dry, and intact about the bilateral lower extremities and lumbar spine.\par VASCULAR: There is brisk capillary refill about the bilateral lower extremities.\par NEUROLOGIC: There are no pathologic reflexes. There is no decrease in sensation of the bilateral lower extremities on Wartenberg pinwheel examination. Deep tendon reflexes are well maintained at 2+/4 of the bilateral lower extremities and are symmetric..\par MUSCULOSKELETAL: There is no visible muscular atrophy. Manual motor strength is well maintained in the bilateral lower extremities. Range of motion of lumbar spine is well maintained. The patient ambulates in a non-myelopathic manner. Negative tension sign and straight leg raise bilaterally. Quad extension, ankle dorsiflexion, EHL, plantar flexion, and ankle eversion are well preserved. Normal secondary orthopaedic exam of bilateral hips, greater trochanteric area, knees and ankles, mechanically orientated low back pain also bilateral trigger points in the inferior lumbar paraspinal musculature [de-identified] : X-rays have been reviewed from today's date of November 17, 2022 as well as October 27, 2022 implants are well-positioned 3 levels of interbody I believe there is a subtle compression of L1.

## 2022-11-17 NOTE — PROCEDURE
[de-identified] : Verbal consent was obtained and all questions, comments and concerns were addressed.  After bilateral lumbar paraspinal trigger point in the affected area into superficial muscular trigger point was cleansed with alcohol prep X 3, ethyl chloride was used as local anesthetic.  Under sterile precautions 1cc of 1 percent lidocaine without epinephrine, plus 10 mg depomedrol, were instilled into the affected trigger points.  Patient tolerated procedure well. Dry sterile dressing was placed and patient described relief of pain 5 minutes after procedure was performed.

## 2022-11-17 NOTE — DISCUSSION/SUMMARY
[de-identified] : Patient tolerated trigger point injection very well with regard to the L1 compression fracture management patient is can to follow-up in 1 month we are going to initiate soft and rigid LSO bracing.  Follow-up in 1 month for repeat x-rays we have discussed kyphoplasty procedures but based upon her pain profile and improvement I would not focus on that aspect of treatment.

## 2022-11-17 NOTE — HISTORY OF PRESENT ILLNESS
[de-identified] : Presents for follow-up to back pain that occurred after a fall she states she is slowly improving.  Takes Advil and a light muscle relaxant methocarbamol. [Improving] : improving [1] : a current pain level of 1/10 [6] : a maximum pain level of 6/10 [Intermit.] : ~He/She~ states the symptoms seem to be intermittent [Bending] : worsened by bending [Lifting] : worsened by lifting [Walking] : worsened by walking [Weight Bearing] : worsened by weight bearing [NSAIDs] : relieved by nonsteroidal anti-inflammatory drugs [Rest] : relieved by rest [Ataxia] : no ataxia [Incontinence] : no incontinence [Loss of Dexterity] : good dexterity [Urinary Ret.] : no urinary retention [de-identified] : Trigger point injection

## 2022-12-13 ENCOUNTER — NON-APPOINTMENT (OUTPATIENT)
Age: 67
End: 2022-12-13

## 2022-12-13 PROBLEM — R00.0 INAPPROPRIATE SINUS TACHYCARDIA: Status: ACTIVE | Noted: 2022-12-13

## 2022-12-14 ENCOUNTER — APPOINTMENT (OUTPATIENT)
Dept: CARDIOLOGY | Facility: CLINIC | Age: 67
End: 2022-12-14

## 2022-12-14 PROCEDURE — 93308 TTE F-UP OR LMTD: CPT

## 2022-12-20 ENCOUNTER — APPOINTMENT (OUTPATIENT)
Dept: ORTHOPEDIC SURGERY | Facility: CLINIC | Age: 67
End: 2022-12-20

## 2022-12-20 VITALS
SYSTOLIC BLOOD PRESSURE: 126 MMHG | HEART RATE: 97 BPM | BODY MASS INDEX: 34.75 KG/M2 | WEIGHT: 177 LBS | DIASTOLIC BLOOD PRESSURE: 85 MMHG | HEIGHT: 60 IN

## 2022-12-20 DIAGNOSIS — M85.80 OTHER SPECIFIED DISORDERS OF BONE DENSITY AND STRUCTURE, UNSPECIFIED SITE: ICD-10-CM

## 2022-12-20 PROCEDURE — 99024 POSTOP FOLLOW-UP VISIT: CPT

## 2022-12-20 PROCEDURE — 72100 X-RAY EXAM L-S SPINE 2/3 VWS: CPT

## 2022-12-20 NOTE — PROCEDURE
[de-identified] : Verbal consent was obtained and all questions, comments and concerns were addressed.  After trigger point in the affected area into superficial muscular trigger point was cleansed with alcohol prep X 3, ethyl chloride was used as local anesthetic.  Under sterile precautions 1cc of 1 percent lidocaine without epinephrine, plus 10 mg depomedrol, ketorolac 30 mg were instilled into the affected trigger points 1 on each side of mid lumbar paraspinals left and right.  Patient tolerated procedure well. Dry sterile dressing was placed and patient described relief of pain 5 minutes after procedure was performed.

## 2022-12-20 NOTE — DISCUSSION/SUMMARY
[Surgical risks reviewed] : Surgical risks reviewed [4 Weeks] : in 4 weeks [de-identified] : Physical therapy is ordered and she will start in her new insurance.  January 1, 2023.  I think it will help to decrease her myositis and increase her activity.  She did well after trigger point injection for myositis.  She will wean off of use of the LSO brace within the next 4 to 6 weeks.  MRI of the lumbar spine is ordered is medically necessary to evaluate lumbar compression fracture which is still causing pain enough to decrease her activities of daily living.  Intermittent Tylenol 1000 mg 3 times daily and mild to moderate pain, ibuprofen 600 mg 3 times daily as needed breakthrough pain.  Ice, heat, topicals as needed.  She is following up with rheumatologist shortly to go over her DEXA scan results as well as to institute possible treatment plan.  For the time being she is encouraged to continue weightbearing exercise and take calcium 600 mg twice daily over-the-counter.  We will follow-up in 4 weeks or as needed at which point if pain continues about her L1 area we will consider possible kyphoplasty procedure decrease mechanical back pain.

## 2022-12-20 NOTE — PHYSICAL EXAM
[de-identified] : CONSTITUTIONAL: The patient is a very pleasant individual who is well-nourished and who appears stated age.\par PSYCHIATRIC: The patient is alert and oriented X 3 and in no apparent distress, and participates with orthopedic evaluation well.\par HEAD: Atraumatic and is nonsyndromic in appearance.\par EENT: No visible thyromegaly, EOMI.\par RESPIRATORY: Respiratory rate is regular, not dyspneic on examination.\par LYMPHATICS: There is no inguinal lymphadenopathy\par INTEGUMENTARY: Skin is clean, dry, and intact about the bilateral lower extremities and lumbar spine.\par VASCULAR: There is brisk capillary refill about the bilateral lower extremities.\par NEUROLOGIC: There are no pathologic reflexes. There is no decrease in sensation of the bilateral lower extremities on Wartenberg pinwheel examination. Deep tendon reflexes are well maintained at 2+/4 of the bilateral lower extremities and are symmetric..\par MUSCULOSKELETAL: There is no visible muscular atrophy. Manual motor strength is well maintained in the bilateral lower extremities. Range of motion of lumbar spine is well maintained. The patient ambulates in a non-myelopathic manner. Negative tension sign and straight leg raise bilaterally. Quad extension, ankle dorsiflexion, EHL, plantar flexion, and ankle eversion are well preserved. Normal secondary orthopaedic exam of bilateral hips, greater trochanteric area, knees and ankles. exam is highlighted by bilateral trigger points of the mid lumbar paraspinals.   [de-identified] : X-ray AP lateral lumbar spine done December 20, 2022 in the office demonstrates a well-preserved superior endplate L1 compression fracture has not worsened since last visit.\par She had a bone density test done at [facility] radiology report states osteopenia, -2.2 on the DEXA scale

## 2022-12-20 NOTE — HISTORY OF PRESENT ILLNESS
[de-identified] : Presents for follow-up to back pain that occurred after a fall at a restaurant, tripped backwards on a podium, she states she is slowly improving. Takes Advil and a light muscle relaxant methocarbamol. \par \par She states the symptoms are improving. Pain levels include a current pain level of 1/10 and a maximum pain level of 5/10. Her symptoms occur after a long day of standing bending lifting, usually start in the afternoon.  Denies any weakness of the bilateral lower extremities.  Denies any change in bowel or bladder.\par She states the symptoms seem to be intermittent. \par \par Modifying factors - worsened by bending, worsened by lifting, worsened by walking and worsened by weight bearing. Relieving factors include relieved by nonsteroidal anti-inflammatory drugs and relieved by rest . Trigger point injection. \par Associated Symptoms: no ataxia, no inc

## 2023-01-09 ENCOUNTER — NON-APPOINTMENT (OUTPATIENT)
Age: 68
End: 2023-01-09

## 2023-01-23 ENCOUNTER — NON-APPOINTMENT (OUTPATIENT)
Age: 68
End: 2023-01-23

## 2023-02-02 ENCOUNTER — APPOINTMENT (OUTPATIENT)
Dept: ORTHOPEDIC SURGERY | Facility: CLINIC | Age: 68
End: 2023-02-02
Payer: MEDICARE

## 2023-02-02 VITALS
WEIGHT: 179 LBS | BODY MASS INDEX: 31.71 KG/M2 | DIASTOLIC BLOOD PRESSURE: 78 MMHG | HEART RATE: 88 BPM | HEIGHT: 63 IN | SYSTOLIC BLOOD PRESSURE: 123 MMHG

## 2023-02-02 PROCEDURE — 99215 OFFICE O/P EST HI 40 MIN: CPT | Mod: 24

## 2023-02-02 PROCEDURE — 72100 X-RAY EXAM L-S SPINE 2/3 VWS: CPT

## 2023-02-02 NOTE — DISCUSSION/SUMMARY
[de-identified] : Aced upon 10 out of 10 pain intermittent pain at activity levels unacceptable patient wishes for a kyphoplasty at T12 in an effort to improve her pain and quality of life.  We discussed kyphoplasty's potentials adjacent fracture at L1.  Patient is can proceed with scheduling of a T12 kyphoplasty on an ASAP basis

## 2023-02-02 NOTE — HISTORY OF PRESENT ILLNESS
[de-identified] : Trinh is a very pleasant 67-year-old female presents in follow-up to this known T12 compression fracture after a fall in October sequential x-rays show the T12 compression fracture as well as a new MRI.  Patient states her pain right now is uncontrolled is not acceptable she was playing with her grandchildren little he had a lay down because of unacceptable pain levels intermittently she states her pain levels a 10 out of 10 [Worsening] : worsening [___ mths] : [unfilled] month(s) ago [10] : a maximum pain level of 10/10 [Intermit.] : ~He/She~ states the symptoms seem to be intermittent [Bending] : worsened by bending [Lifting] : worsened by lifting [Rest] : relieved by rest [Ataxia] : no ataxia [Incontinence] : no incontinence [Loss of Dexterity] : good dexterity [Urinary Ret.] : no urinary retention

## 2023-02-02 NOTE — PHYSICAL EXAM
[Antalgic] : antalgic [de-identified] : CONSTITUTIONAL: The patient is a very pleasant individual who is well-nourished and who appears stated age.\par PSYCHIATRIC: The patient is alert and oriented X 3 and in no apparent distress, and participates with orthopedic evaluation well.\par HEAD: Atraumatic and is nonsyndromic in appearance.\par EENT: No visible thyromegaly, EOMI.\par RESPIRATORY: Respiratory rate is regular, not dyspneic on examination.\par LYMPHATICS: There is no inguinal lymphadenopathy\par INTEGUMENTARY: Skin is clean, dry, and intact about the bilateral lower extremities and lumbar spine.\par VASCULAR: There is brisk capillary refill about the bilateral lower extremities.\par NEUROLOGIC: There are no pathologic reflexes. There is no decrease in sensation of the bilateral lower extremities on Wartenberg pinwheel examination. Deep tendon reflexes are well maintained at 2+/4 of the bilateral lower extremities and are symmetric..\par MUSCULOSKELETAL: There is no visible muscular atrophy. Manual motor strength is well maintained in the bilateral lower extremities. Range of motion of lumbar spine is well maintained. The patient ambulates in a non-myelopathic manner. Negative tension sign and straight leg raise bilaterally. Quad extension, ankle dorsiflexion, EHL, plantar flexion, and ankle eversion are well preserved. Normal secondary orthopaedic exam of bilateral hips, greater trochanteric area, knees and ankles, mechanically orientated back pain/transitional back pain I believe associated with this T12 compression fracture [de-identified] : X-rays have been reviewed on today's date that demonstrates a T12 compression fracture.  This was performed on February 2, 2023.  MRI from [facility] also demonstrates this compression fracture at T12.

## 2023-02-06 ENCOUNTER — OUTPATIENT (OUTPATIENT)
Dept: OUTPATIENT SERVICES | Facility: HOSPITAL | Age: 68
LOS: 1 days | End: 2023-02-06
Payer: MEDICARE

## 2023-02-06 VITALS
OXYGEN SATURATION: 98 % | HEART RATE: 92 BPM | DIASTOLIC BLOOD PRESSURE: 60 MMHG | SYSTOLIC BLOOD PRESSURE: 110 MMHG | HEIGHT: 63 IN | TEMPERATURE: 97 F | WEIGHT: 184.31 LBS | RESPIRATION RATE: 18 BRPM

## 2023-02-06 DIAGNOSIS — S32.000S WEDGE COMPRESSION FRACTURE OF UNSPECIFIED LUMBAR VERTEBRA, SEQUELA: ICD-10-CM

## 2023-02-06 DIAGNOSIS — Z90.49 ACQUIRED ABSENCE OF OTHER SPECIFIED PARTS OF DIGESTIVE TRACT: Chronic | ICD-10-CM

## 2023-02-06 DIAGNOSIS — Z98.891 HISTORY OF UTERINE SCAR FROM PREVIOUS SURGERY: Chronic | ICD-10-CM

## 2023-02-06 DIAGNOSIS — Z29.9 ENCOUNTER FOR PROPHYLACTIC MEASURES, UNSPECIFIED: ICD-10-CM

## 2023-02-06 DIAGNOSIS — Z98.890 OTHER SPECIFIED POSTPROCEDURAL STATES: Chronic | ICD-10-CM

## 2023-02-06 DIAGNOSIS — Z86.79 PERSONAL HISTORY OF OTHER DISEASES OF THE CIRCULATORY SYSTEM: ICD-10-CM

## 2023-02-06 DIAGNOSIS — Z01.818 ENCOUNTER FOR OTHER PREPROCEDURAL EXAMINATION: ICD-10-CM

## 2023-02-06 LAB
A1C WITH ESTIMATED AVERAGE GLUCOSE RESULT: 6.1 % — HIGH (ref 4–5.6)
ANION GAP SERPL CALC-SCNC: 9 MMOL/L — SIGNIFICANT CHANGE UP (ref 5–17)
APTT BLD: 30.2 SEC — SIGNIFICANT CHANGE UP (ref 27.5–35.5)
BASOPHILS # BLD AUTO: 0.09 K/UL — SIGNIFICANT CHANGE UP (ref 0–0.2)
BASOPHILS NFR BLD AUTO: 1.1 % — SIGNIFICANT CHANGE UP (ref 0–2)
BLD GP AB SCN SERPL QL: SIGNIFICANT CHANGE UP
BUN SERPL-MCNC: 15.1 MG/DL — SIGNIFICANT CHANGE UP (ref 8–20)
CALCIUM SERPL-MCNC: 8.9 MG/DL — SIGNIFICANT CHANGE UP (ref 8.4–10.5)
CHLORIDE SERPL-SCNC: 102 MMOL/L — SIGNIFICANT CHANGE UP (ref 96–108)
CO2 SERPL-SCNC: 27 MMOL/L — SIGNIFICANT CHANGE UP (ref 22–29)
CREAT SERPL-MCNC: 0.61 MG/DL — SIGNIFICANT CHANGE UP (ref 0.5–1.3)
EGFR: 99 ML/MIN/1.73M2 — SIGNIFICANT CHANGE UP
EOSINOPHIL # BLD AUTO: 0.22 K/UL — SIGNIFICANT CHANGE UP (ref 0–0.5)
EOSINOPHIL NFR BLD AUTO: 2.8 % — SIGNIFICANT CHANGE UP (ref 0–6)
ESTIMATED AVERAGE GLUCOSE: 128 MG/DL — HIGH (ref 68–114)
GLUCOSE SERPL-MCNC: 104 MG/DL — HIGH (ref 70–99)
HCT VFR BLD CALC: 43.5 % — SIGNIFICANT CHANGE UP (ref 34.5–45)
HGB BLD-MCNC: 13.9 G/DL — SIGNIFICANT CHANGE UP (ref 11.5–15.5)
IMM GRANULOCYTES NFR BLD AUTO: 0.4 % — SIGNIFICANT CHANGE UP (ref 0–0.9)
INR BLD: 1.11 RATIO — SIGNIFICANT CHANGE UP (ref 0.88–1.16)
LYMPHOCYTES # BLD AUTO: 2.15 K/UL — SIGNIFICANT CHANGE UP (ref 1–3.3)
LYMPHOCYTES # BLD AUTO: 27.4 % — SIGNIFICANT CHANGE UP (ref 13–44)
MCHC RBC-ENTMCNC: 30.3 PG — SIGNIFICANT CHANGE UP (ref 27–34)
MCHC RBC-ENTMCNC: 32 GM/DL — SIGNIFICANT CHANGE UP (ref 32–36)
MCV RBC AUTO: 94.8 FL — SIGNIFICANT CHANGE UP (ref 80–100)
MONOCYTES # BLD AUTO: 0.91 K/UL — HIGH (ref 0–0.9)
MONOCYTES NFR BLD AUTO: 11.6 % — SIGNIFICANT CHANGE UP (ref 2–14)
NEUTROPHILS # BLD AUTO: 4.44 K/UL — SIGNIFICANT CHANGE UP (ref 1.8–7.4)
NEUTROPHILS NFR BLD AUTO: 56.7 % — SIGNIFICANT CHANGE UP (ref 43–77)
PLATELET # BLD AUTO: 310 K/UL — SIGNIFICANT CHANGE UP (ref 150–400)
POTASSIUM SERPL-MCNC: 4.3 MMOL/L — SIGNIFICANT CHANGE UP (ref 3.5–5.3)
POTASSIUM SERPL-SCNC: 4.3 MMOL/L — SIGNIFICANT CHANGE UP (ref 3.5–5.3)
PROTHROM AB SERPL-ACNC: 12.9 SEC — SIGNIFICANT CHANGE UP (ref 10.5–13.4)
RBC # BLD: 4.59 M/UL — SIGNIFICANT CHANGE UP (ref 3.8–5.2)
RBC # FLD: 14.3 % — SIGNIFICANT CHANGE UP (ref 10.3–14.5)
SODIUM SERPL-SCNC: 138 MMOL/L — SIGNIFICANT CHANGE UP (ref 135–145)
WBC # BLD: 7.84 K/UL — SIGNIFICANT CHANGE UP (ref 3.8–10.5)
WBC # FLD AUTO: 7.84 K/UL — SIGNIFICANT CHANGE UP (ref 3.8–10.5)

## 2023-02-06 PROCEDURE — 93005 ELECTROCARDIOGRAM TRACING: CPT

## 2023-02-06 PROCEDURE — G0463: CPT

## 2023-02-06 PROCEDURE — 93010 ELECTROCARDIOGRAM REPORT: CPT

## 2023-02-06 RX ORDER — METOPROLOL TARTRATE 50 MG
0 TABLET ORAL
Qty: 0 | Refills: 0 | DISCHARGE

## 2023-02-06 RX ORDER — DESLORATADINE AND PSEUDOEPHEDRINE SULFATE 2.5; 12 MG/1; MG/1
1 TABLET, EXTENDED RELEASE ORAL
Qty: 0 | Refills: 0 | DISCHARGE

## 2023-02-06 RX ORDER — ABALOPARATIDE 2000 UG/ML
0 INJECTION, SOLUTION SUBCUTANEOUS
Qty: 0 | Refills: 0 | DISCHARGE

## 2023-02-06 NOTE — H&P PST ADULT - NSICDXPASTMEDICALHX_GEN_ALL_CORE_FT
PAST MEDICAL HISTORY:  Hyperlipidemia     Spinal stenosis, lumbar region, with neurogenic claudication     Spondylosis without myelopathy or radiculopathy, lumbar region      PAST MEDICAL HISTORY:  Compression fracture of vertebra     H/O sinus tachycardia     History of polymyalgia rheumatica     Hyperlipidemia     Spinal stenosis, lumbar region, with neurogenic claudication     Spondylosis without myelopathy or radiculopathy, lumbar region

## 2023-02-06 NOTE — H&P PST ADULT - ASSESSMENT
Pt is a 67  years old female seen today pre-op for T12 Kyphoplasty, Pt s/p lumbar fusion l2-L5. Pt report T12 compression fracture after a fall in October sequential x-rays show the T12 compression fracture as well as a new MRI.  Pain aggravated by standing, walking for a period of time, weight bearing activities, difficulty playing with her grandchildren due to back pain, difficulty bending down, lifting. Pt  states her pain levels is  7/10 to  10 out of 10 with activity, described as sharp pain. Pt  denies numbness and tingling. Pt past medical hx includes HLD, spinal stenosis lumbar region, Polymyalgia Rheumatica, Tachycardia. Seen today for a scheduled surgery 2/15/23 by Dr. Aguero. Surgery protocol reviewed with Pt today. Pt to follow-up with PCP for medical clearance, instructions for COVID PCR Test requirement prior to this surgery provided to Pt today.  CAPRINI VTE 2.0 SCORE [CLOT updated 2019]    AGE RELATED RISK FACTORS                                                       MOBILITY RELATED FACTORS  [ ] Age 41-60 years                                            (1 Point)                    [ ] Bed rest                                                        (1 Point)  [x ] Age: 61-74 years                                           (2 Points)                  [ ] Plaster cast                                                   (2 Points)  [ ] Age= 75 years                                              (3 Points)                    [ ] Bed bound for more than 72 hours                 (2 Points)    DISEASE RELATED RISK FACTORS                                               GENDER SPECIFIC FACTORS  [ ] Edema in the lower extremities                       (1 Point)              [ ] Pregnancy                                                     (1 Point)  [ ] Varicose veins                                               (1 Point)                     [ ] Post-partum < 6 weeks                                   (1 Point)             [x ] BMI > 25 Kg/m2                                            (1 Point)                     [ ] Hormonal therapy  or oral contraception          (1 Point)                 [ ] Sepsis (in the previous month)                        (1 Point)               [ ] History of pregnancy complications                 (1 point)  [ ] Pneumonia or serious lung disease                                               [ ] Unexplained or recurrent                     (1 Point)           (in the previous month)                               (1 Point)  [ ] Abnormal pulmonary function test                     (1 Point)                 SURGERY RELATED RISK FACTORS  [ ] Acute myocardial infarction                              (1 Point)               [ ]  Section                                             (1 Point)  [ ] Congestive heart failure (in the previous month)  (1 Point)      [ ] Minor surgery                                                  (1 Point)   [ ] Inflammatory bowel disease                             (1 Point)               [ ] Arthroscopic surgery                                        (2 Points)  [ ] Central venous access                                      (2 Points)                [x ] General surgery lasting more than 45 minutes (2 points)  [ ] Malignancy- Present or previous                   (2 Points)                [ ] Elective arthroplasty                                         (5 points)    [ ] Stroke (in the previous month)                          (5 Points)                                                                                                                                                           HEMATOLOGY RELATED FACTORS                                                 TRAUMA RELATED RISK FACTORS  [ ] Prior episodes of VTE                                     (3 Points)                [ ] Fracture of the hip, pelvis, or leg                       (5 Points)  [ ] Positive family history for VTE                         (3 Points)             [ ] Acute spinal cord injury (in the previous month)  (5 Points)  [ ] Prothrombin 28614 A                                     (3 Points)               [ ] Paralysis  (less than 1 month)                             (5 Points)  [ ] Factor V Leiden                                             (3 Points)                  [ ] Multiple Trauma within 1 month                        (5 Points)  [ ] Lupus anticoagulants                                     (3 Points)                                                           [ ] Anticardiolipin antibodies                               (3 Points)                                                       [ ] High homocysteine in the blood                      (3 Points)                                             [ ] Other congenital or acquired thrombophilia      (3 Points)                                                [ ] Heparin induced thrombocytopenia                  (3 Points)                                     Total Score [    5      ]   OPIOID RISK TOOL    KASSANDRA EACH BOX THAT APPLIES AND ADD TOTALS AT THE END    FAMILY HISTORY OF SUBSTANCE ABUSE                 FEMALE         MALE                                                Alcohol                             [  ]1 pt          [  ]3pts                                               Illegal Durgs                     [  ]2 pts        [  ]3pts                                               Rx Drugs                           [  ]4 pts        [  ]4 pts    PERSONAL HISTORY OF SUBSTANCE ABUSE                                                                                          Alcohol                             [  ]3 pts       [  ]3 pts                                               Illegal Drugs                     [  ]4 pts        [  ]4 pts                                               Rx Drugs                           [  ]5 pts        [  ]5 pts    AGE BETWEEN 16-45 YEARS                                      [  ]1 pt         [  ]1 pt    HISTORY OF PREADOLESCENT   SEXUAL ABUSE                                                             [  ]3 pts        [  ]0pts    PSYCHOLOGICAL DISEASE                     ADD, OCD, Bipolar, Schizophrenia        [  ]2 pts         [  ]2 pts                      Depression                                               [  ]1 pt           [  ]1 pt           SCORING TOTAL   (add numbers and type here)              (*0**)                                     A score of 3 or lower indicated LOW risk for future opioid abuse  A score of 4 to 7 indicated moderate risk for future opioid abuse  A score of 8 or higher indicates a high risk for opioid abuse

## 2023-02-06 NOTE — H&P PST ADULT - BACK EXAM
straightened/loss of curvature/cannot attain upright position straightened/loss of curvature/cannot attain upright position/ROM limited

## 2023-02-06 NOTE — H&P PST ADULT - HISTORY OF PRESENT ILLNESS
Pt had fall around Fall of 2022, Pt s/p lumbar fusion l2-L5. Pt report lumbar region pain now progressively worsening. Pain aggravated by standing, walking for a period of time, weight bearing activities.  Pt is a 67  years old female seen today pre-op for T12 Kyphoplasty, Pt s/p lumbar fusion l2-L5. Pt report T12 compression fracture after a fall in October sequential x-rays show the T12 compression fracture as well as a new MRI.  Pain aggravated by standing, walking for a period of time, weight bearing activities, difficulty playing with her grandchildren, difficulty bending down, lifting. Pt  states her pain levels is  10 out of 10, sharp pain. pt denies numbness and tingling. Seen today for a scheduled surgery 2/15/23 by Dr. Aguero      Pt is a 67  years old female seen today pre-op for T12 Kyphoplasty, Pt s/p lumbar fusion l2-L5. Pt report T12 compression fracture after a fall in October sequential x-rays show the T12 compression fracture as well as a new MRI.  Pain aggravated by standing, walking for a period of time, weight bearing activities, difficulty playing with her grandchildren due to back pain, difficulty bending down, lifting. Pt  states her pain levels is  7/10 to  10 out of 10 with activity, described as sharp pain. Pt  denies numbness and tingling. Pt past medical hx includes HLD, spinal stenosis lumbar region, Polymyalgia Rheumatica, Tachycardia. Seen today for a scheduled surgery 2/15/23 by Dr. Aguero

## 2023-02-06 NOTE — H&P PST ADULT - NSANTHSNORERD_ENT_A_CORE
[facility]  NEUROSURGERY CV-Stroke APC  Progress Note      SUBJECTIVE/CC:   Pt seen and examined this evening on rounds. Pt resting comfortably in bed upon exam. Recently transferred out of NCCU to the floor today. Pt repeatedly coughing/agitated throughout exam. RN states pt was given sip of water after evening medications and believe he may have aspirated the water. Pt also with episode of fever overnight, resolved with Tylenol. Neuro exam remains stable. No new complaints. No acute events overnight.     PMR following over the next week to see if patient would be candidate for acute rehab     OBJECTIVE:   Blood pressure 114/51, pulse 78, temperature 96.8 °F (36 °C), resp. rate 16, height 5' 4.96\" (1.65 m), weight 56.4 kg (124 lb 6.4 oz), SpO2 95 %.    Intake/Output Summary (Last 24 hours) at 9/18/2020 0133  Last data filed at 9/17/2020 1159  Gross per 24 hour   Intake 76.34 ml   Output --   Net 76.34 ml     CURRENT MEDICATIONS:  Current Facility-Administered Medications   Medication Dose Route Frequency Provider Last Rate Last Dose   • clopidogrel (PLAVIX) tablet 75 mg  75 mg Per NG tube Daily Moe Taylor MD   75 mg at 09/16/20 1809   • amLODIPine (NORVASC) tablet 2.5 mg  2.5 mg Per NG tube Daily Moe Taylor MD   2.5 mg at 09/16/20 1808   • acetaminophen (TYLENOL) tablet 650 mg  650 mg Oral Q4H PRN Reyna Martinez, CNP   650 mg at 09/17/20 2126   • docusate sodium-sennosides (SENOKOT S) 50-8.6 MG 1 tablet  1 tablet OG Tube BID Moe Taylor MD   1 tablet at 09/17/20 2127   • polyethylene glycol (MIRALAX) packet 17 g  17 g OG Tube Daily Moe Taylor MD   17 g at 09/16/20 0751   • metoPROLOL tartrate (LOPRESSOR) tablet 12.5 mg  12.5 mg OG Tube 2 times per day Moe Taylor MD   12.5 mg at 09/17/20 2127   • enoxaparin (LOVENOX) injection 40 mg  40 mg Subcutaneous Q24H Moe Taylor MD   40 mg at 09/17/20 1259   • fentaNYL (SUBLIMAZE) injection 25 mcg  25 mcg Intravenous Q2H  PRN Yas Jackson CNP   25 mcg at 09/15/20 0936   • sodium chloride 0.9 % flush bag 25 mL  25 mL Intravenous PRN Yas Jackson CNP       • sodium chloride (PF) 0.9 % injection 2 mL  2 mL Intracatheter 2 times per day Yas Jackson CNP   2 mL at 09/17/20 2150   • sodium chloride 0.9% infusion   Intravenous Continuous Yas Jackson CNP   Stopped at 09/16/20 1813   • hydrALAZINE (APRESOLINE) injection 10 mg  10 mg Intravenous Q4H PRN Yas Jackson CNP   10 mg at 09/17/20 1654   • petrolatum (white)-mineral oil ophthalmic ointment 1 application  1 application Both Eyes 4x Daily PRN Yas Jackson CNP       • dextrose 50 % injection 25 g  25 g Intravenous PRN Yas Jackson, CNP       • dextrose 50 % injection 12.5 g  12.5 g Intravenous PRN Yas Jackson CNP       • glucagon (GLUCAGEN) injection 1 mg  1 mg Intramuscular PRN Yas Jackson CNP       • dextrose (GLUTOSE) 40 % gel 15 g  15 g Oral PRN Yas Jackson, CNP       • dextrose (GLUTOSE) 40 % gel 30 g  30 g Oral PRN Yas Jackson CNP       • insulin regular (human) (HumuLIN R, NovoLIN R) correction dose   Subcutaneous 4 times per day Yas Jackson CNP   Stopped at 09/14/20 0306   • Magnesium Standard Replacement Protocol   Does not apply See Admin Instructions Yas Jackson CNP       • Potassium Standard Replacement Protocol   Does not apply See Admin Instructions Yas Jackson CNP       • Phosphorus Standard Replacement Protocol   Does not apply See Admin Instructions Yas Jackson CNP       • atorvastatin (LIPITOR) tablet 40 mg  40 mg Oral Nightly Yas Jackson CNP   40 mg at 09/17/20 2127       REVIEW OF SYSTEMS:  Unable to obtain ROS due to pt's clinical condition     NIH STROKE SCALE:  TIME PERFORMED: 0330, Date: 9/18/2020      1a.  Level of consciousness: 0=alert; keenly responsive   1b.  LOC questions:2=Answers neither questions correctly   1c.  LOC commands: 2=Performs neither task correctly   2.    Best Gaze:0=normal   3.     Visual: 0=No visual loss   4.    Facial Palsy: 0=Normal symmetric movement   5a.  Motor left arm: 3=No effort against gravity, limb falls   5b.  Motor right arm: 1=Drift, limb holds 90 (or 45) degrees but drifts down before full 10 seconds: does not hit bed   6a.  Motor left leg: 3=No effect against gravity, limb falls   6b.  Motor right le=Drift, limb holds 90 (or 45) degrees but drifts down before full 5 seconds, does not hit bed   7.    Limb Ataxia: 0=Absent   8.    Sensory: 0=Normal; no sensory loss   9.    Best Language:3=Mute, global aphasia; no usable speech or auditory or auditory comprehension  10.   Dysarthria: 2=Severe; patient speech is so slurred as to be unintelligible in the absence of or our of proportion to any dysphagia, or is mute/anarthric  11.   Extinction and Inattention: 0=No abnormality     Total NIHSS: 17    PHYSICAL EXAM:   Constitutional:       Comments: opens eyes to verbal stimulation    HENT:      Head: Normocephalic.   Eyes:      Conjunctiva/sclera: Conjunctivae normal.      Pupils: Pupils are equal, round, and reactive to light.      Comments: No gaze preference    Neck:      Musculoskeletal: Neck supple. No neck rigidity.   Cardiovascular:      Rate and Rhythm: Normal rate and regular rhythm.      Pulses: Normal pulses.      Comments: Radial pulses are palpable bilaterally, 2+  Capillary refill is brisk in bilateral hands  Hands are equally warm to touch    Pulmonary:      Comments: symmetric expansion, non-labored breathing. Repeatedly coughing throughout exam   Abdominal:      General: There is no distension.      Palpations: Abdomen is soft.   Musculoskeletal: Normal range of motion.   Skin:     General: Skin is warm and dry.      Comments: Right radial site is C/D/I  No bleeding/drainage, previous hematoma resolving, site is soft    Significant ecchymosis noted, No erythema or edema noted      Neurological:      Comments: Eyes closed, opens to voice  PERRL, no gaze  preference, unable to assess EOMs, pt. Does not track examiner, unable to test visual fields   Left facial droop present at rest, difficult to assess b/c pt not following commands  Unable to assess for tongue deviation   Did not follow commands for shoulder shrug  Difficult to assess most of CN exam d/t pt. Condition   Strength:        - Moving all extremities spontaneously, R>L       - Not following any command, reflexive R.        - RUE localizes and moves purposefully, seen raising in bed with no drift       LUE + BLE withdraws to pain   Sensation is intact to painful stimuli in all extremities .  Nonverbal - very few hypophonic noises noted during exam     DATA:    Labs:  Recent Labs   Lab 09/17/20  0412   WBC 9.9   RBC 3.25*   HGB 10.1*   HCT 30.8*        Recent Labs   Lab 09/17/20  1711 09/17/20  0412 09/17/20  0350 09/16/20  0340   SODIUM  --  141 142 140   POTASSIUM 3.9 3.8 4.1 3.9   CHLORIDE  --  114* 114* 111*   CO2  --  18* 17* 23   BUN  --  25* 24* 27*   CREATININE  --  0.76 0.82 0.93   GLUCOSE  --  109* 105* 91   CALCIUM  --  8.4 8.2* 8.3*     Recent Labs   Lab 09/13/20 2052   PTT 27   PT 11.2   INR 1.1       IMAGING: I have personally reviewed all of the following imaging  No recent images are attached to this encounter     IMPRESSION  Patient is a 89 year old yo male with PMH significant for HTN who presents as an acute stroke transfer from [facility]. Pt intubated and paralyzed upon arrival to [facility] ED. S/p tpa administration. Pt transferred to [facility] and taken directly to IR angio suite for mechanical thrombectomy of mid basilar occlusion. NIHSS 36 upon arrival      POD#5 - cerebral angiogram with mechanical thombectomy of mid basilar occlusion. TICI 3 recanalization of basilar artery, TICI 2B recanalization of R. PCA    RECOMMENDATIONS:  - Please continue Neuro/Radial/PV/VS checks as ordered  - Procedures:        - No additional NSG procedures planned at this time   - SBP  goals: normotension   - Neuro Medications:       - ASA 81mg daily switched to plavix 75mg daily since pt previously on ASA 81 and still suffered a new stroke             - Ok for DAPT from GRAY standpoint - however will defer to stroke neurology         - Continue atorvastatin 40mg daily   - Imaging (planned and/or reviewed)       - All previous imaging has been reviewed personally and by neuro-endovascular attending Dr. Darby       - No additional imaging planned from NSG perspective, unless pt. Develops neuro changes.   - Labs: most recent labs reviewed, routine labs per primary team  - Diet: ADAT per speech  - Activity: AAT, C-Collar cleared 9/15  - Therapies: PT/OT/ST following, appreciate recs  - DVT Ppx: SCDs, Lovenox  - Consults:       - Neuro/stroke: plavix 75mg, considering adding ASA 81, atorva 40   - PMR: will continue to follow over next week to assess pt's functional status   - Medical management per primary team, appreciate input.   - Family deciding GOC, would like to wait on DHT placement at this time   - Consider rehab consult if pt continues to improve neuro wise as he was mostly independent at home prior to this hospitalization   - Pt will need to follow up with Kiffon Keigher in clinic in 2-4 weeks. Please see depart information/discharge instructions   - NeuroIR will follow peripherally at this time     Dispo: 10T    Will discuss with NSG team. Please call  with any questions/concerns    Liana Johnson PA-C  CV-NSG  9/18/2020   Yes

## 2023-02-07 LAB
MRSA PCR RESULT.: SIGNIFICANT CHANGE UP
S AUREUS DNA NOSE QL NAA+PROBE: SIGNIFICANT CHANGE UP

## 2023-02-10 ENCOUNTER — APPOINTMENT (OUTPATIENT)
Dept: CARDIOLOGY | Facility: CLINIC | Age: 68
End: 2023-02-10
Payer: MEDICARE

## 2023-02-10 VITALS
OXYGEN SATURATION: 97 % | HEART RATE: 72 BPM | TEMPERATURE: 98 F | WEIGHT: 180 LBS | HEIGHT: 63 IN | BODY MASS INDEX: 31.89 KG/M2 | DIASTOLIC BLOOD PRESSURE: 81 MMHG | SYSTOLIC BLOOD PRESSURE: 129 MMHG

## 2023-02-10 DIAGNOSIS — Z01.810 ENCOUNTER FOR PREPROCEDURAL CARDIOVASCULAR EXAMINATION: ICD-10-CM

## 2023-02-10 PROCEDURE — 99214 OFFICE O/P EST MOD 30 MIN: CPT

## 2023-02-10 RX ORDER — METHOCARBAMOL 500 MG/1
500 TABLET, FILM COATED ORAL 3 TIMES DAILY
Qty: 30 | Refills: 0 | Status: DISCONTINUED | COMMUNITY
Start: 2022-11-03 | End: 2023-02-10

## 2023-02-10 RX ORDER — FLUTICASONE PROPIONATE 50 UG/1
50 SPRAY, METERED NASAL
Refills: 0 | Status: ACTIVE | COMMUNITY

## 2023-02-10 RX ORDER — ROSUVASTATIN CALCIUM 20 MG/1
20 TABLET, FILM COATED ORAL
Refills: 0 | Status: ACTIVE | COMMUNITY
Start: 2021-07-08

## 2023-02-10 RX ORDER — TIZANIDINE 4 MG/1
4 TABLET ORAL EVERY 8 HOURS
Qty: 20 | Refills: 0 | Status: DISCONTINUED | COMMUNITY
Start: 2022-11-17 | End: 2023-02-10

## 2023-02-14 ENCOUNTER — TRANSCRIPTION ENCOUNTER (OUTPATIENT)
Age: 68
End: 2023-02-14

## 2023-02-14 NOTE — ASU PATIENT PROFILE, ADULT - NSICDXPASTMEDICALHX_GEN_ALL_CORE_FT
PAST MEDICAL HISTORY:  Compression fracture of vertebra     H/O sinus tachycardia     History of polymyalgia rheumatica     Hyperlipidemia     Spinal stenosis, lumbar region, with neurogenic claudication     Spondylosis without myelopathy or radiculopathy, lumbar region

## 2023-02-15 ENCOUNTER — RESULT REVIEW (OUTPATIENT)
Age: 68
End: 2023-02-15

## 2023-02-15 ENCOUNTER — TRANSCRIPTION ENCOUNTER (OUTPATIENT)
Age: 68
End: 2023-02-15

## 2023-02-15 ENCOUNTER — APPOINTMENT (OUTPATIENT)
Dept: ORTHOPEDIC SURGERY | Facility: HOSPITAL | Age: 68
End: 2023-02-15

## 2023-02-15 ENCOUNTER — OUTPATIENT (OUTPATIENT)
Dept: INPATIENT UNIT | Facility: HOSPITAL | Age: 68
LOS: 1 days | End: 2023-02-15
Payer: MEDICARE

## 2023-02-15 VITALS
HEART RATE: 85 BPM | HEIGHT: 63 IN | TEMPERATURE: 98 F | SYSTOLIC BLOOD PRESSURE: 112 MMHG | WEIGHT: 184.31 LBS | RESPIRATION RATE: 16 BRPM | DIASTOLIC BLOOD PRESSURE: 73 MMHG | OXYGEN SATURATION: 96 %

## 2023-02-15 VITALS
HEART RATE: 80 BPM | RESPIRATION RATE: 18 BRPM | DIASTOLIC BLOOD PRESSURE: 60 MMHG | TEMPERATURE: 98 F | OXYGEN SATURATION: 98 % | SYSTOLIC BLOOD PRESSURE: 100 MMHG

## 2023-02-15 DIAGNOSIS — S32.000S WEDGE COMPRESSION FRACTURE OF UNSPECIFIED LUMBAR VERTEBRA, SEQUELA: ICD-10-CM

## 2023-02-15 DIAGNOSIS — Z90.49 ACQUIRED ABSENCE OF OTHER SPECIFIED PARTS OF DIGESTIVE TRACT: Chronic | ICD-10-CM

## 2023-02-15 DIAGNOSIS — M80.88XA OTHER OSTEOPOROSIS WITH CURRENT PATHOLOGICAL FRACTURE, VERTEBRA(E), INITIAL ENCOUNTER FOR FRACTURE: ICD-10-CM

## 2023-02-15 DIAGNOSIS — Z98.891 HISTORY OF UTERINE SCAR FROM PREVIOUS SURGERY: Chronic | ICD-10-CM

## 2023-02-15 DIAGNOSIS — Z98.890 OTHER SPECIFIED POSTPROCEDURAL STATES: Chronic | ICD-10-CM

## 2023-02-15 PROCEDURE — 22513 PERQ VERTEBRAL AUGMENTATION: CPT | Mod: 58

## 2023-02-15 PROCEDURE — 76000 FLUOROSCOPY <1 HR PHYS/QHP: CPT

## 2023-02-15 PROCEDURE — C1726: CPT

## 2023-02-15 PROCEDURE — 36415 COLL VENOUS BLD VENIPUNCTURE: CPT

## 2023-02-15 PROCEDURE — 22513 PERQ VERTEBRAL AUGMENTATION: CPT

## 2023-02-15 PROCEDURE — 88304 TISSUE EXAM BY PATHOLOGIST: CPT | Mod: 26

## 2023-02-15 PROCEDURE — 88304 TISSUE EXAM BY PATHOLOGIST: CPT

## 2023-02-15 PROCEDURE — C1713: CPT

## 2023-02-15 DEVICE — KIT SYS AUTOFLEX W/ HV CEMENT: Type: IMPLANTABLE DEVICE | Status: FUNCTIONAL

## 2023-02-15 DEVICE — STRYKER KIT OMNICURVE FRACTURE 11G 15MM: Type: IMPLANTABLE DEVICE | Status: FUNCTIONAL

## 2023-02-15 DEVICE — BLLN AVAFLEX CRVED 11GX20MM: Type: IMPLANTABLE DEVICE | Status: FUNCTIONAL

## 2023-02-15 RX ORDER — HYDROMORPHONE HYDROCHLORIDE 2 MG/ML
0.5 INJECTION INTRAMUSCULAR; INTRAVENOUS; SUBCUTANEOUS
Refills: 0 | Status: DISCONTINUED | OUTPATIENT
Start: 2023-02-15 | End: 2023-02-15

## 2023-02-15 RX ORDER — FENTANYL CITRATE 50 UG/ML
25 INJECTION INTRAVENOUS
Refills: 0 | Status: DISCONTINUED | OUTPATIENT
Start: 2023-02-15 | End: 2023-02-15

## 2023-02-15 RX ORDER — METOPROLOL TARTRATE 50 MG
0 TABLET ORAL
Qty: 0 | Refills: 0 | DISCHARGE

## 2023-02-15 RX ORDER — SODIUM CHLORIDE 9 MG/ML
1000 INJECTION, SOLUTION INTRAVENOUS
Refills: 0 | Status: DISCONTINUED | OUTPATIENT
Start: 2023-02-15 | End: 2023-02-15

## 2023-02-15 RX ORDER — ABALOPARATIDE 2000 UG/ML
0 INJECTION, SOLUTION SUBCUTANEOUS
Qty: 0 | Refills: 0 | DISCHARGE

## 2023-02-15 RX ORDER — CELECOXIB 200 MG/1
200 CAPSULE ORAL ONCE
Refills: 0 | Status: COMPLETED | OUTPATIENT
Start: 2023-02-15 | End: 2023-02-15

## 2023-02-15 RX ORDER — TRAMADOL HYDROCHLORIDE 50 MG/1
1 TABLET ORAL
Qty: 10 | Refills: 0
Start: 2023-02-15 | End: 2023-02-19

## 2023-02-15 RX ORDER — ACETAMINOPHEN 500 MG
975 TABLET ORAL ONCE
Refills: 0 | Status: COMPLETED | OUTPATIENT
Start: 2023-02-15 | End: 2023-02-15

## 2023-02-15 RX ORDER — CEPHALEXIN 500 MG
1 CAPSULE ORAL
Qty: 4 | Refills: 0
Start: 2023-02-15 | End: 2023-02-15

## 2023-02-15 RX ORDER — CEFAZOLIN SODIUM 1 G
2000 VIAL (EA) INJECTION ONCE
Refills: 0 | Status: DISCONTINUED | OUTPATIENT
Start: 2023-02-15 | End: 2023-02-15

## 2023-02-15 RX ORDER — FLUTICASONE PROPIONATE 50 MCG
0 SPRAY, SUSPENSION NASAL
Qty: 0 | Refills: 0 | DISCHARGE

## 2023-02-15 RX ORDER — APREPITANT 80 MG/1
40 CAPSULE ORAL ONCE
Refills: 0 | Status: COMPLETED | OUTPATIENT
Start: 2023-02-15 | End: 2023-02-15

## 2023-02-15 RX ORDER — ONDANSETRON 8 MG/1
4 TABLET, FILM COATED ORAL ONCE
Refills: 0 | Status: DISCONTINUED | OUTPATIENT
Start: 2023-02-15 | End: 2023-02-15

## 2023-02-15 RX ORDER — ROSUVASTATIN CALCIUM 5 MG/1
0 TABLET ORAL
Qty: 0 | Refills: 1 | DISCHARGE

## 2023-02-15 RX ADMIN — Medication 975 MILLIGRAM(S): at 08:08

## 2023-02-15 RX ADMIN — APREPITANT 40 MILLIGRAM(S): 80 CAPSULE ORAL at 08:08

## 2023-02-15 RX ADMIN — CELECOXIB 200 MILLIGRAM(S): 200 CAPSULE ORAL at 08:09

## 2023-02-15 NOTE — BRIEF OPERATIVE NOTE - NSICDXBRIEFPREOP_GEN_ALL_CORE_FT
PRE-OP DIAGNOSIS:  Osteoporotic compression fracture of spine 15-Feb-2023 10:41:53  Della Whittington  
PRE-OP DIAGNOSIS:  Osteoporotic compression fracture of spine 15-Feb-2023 10:41:53  Della Whittington

## 2023-02-15 NOTE — BRIEF OPERATIVE NOTE - COMMENTS
adequate bone fill via Kyphon cement./Joy kyphos cement.  No visible extravasation.      Patient seen in the recovery room pain is controlled Intra-Op discussed discussed with her  Dash as well patient is comfortable graduated PT OT gait and balance if pain is controlled she can go home later today

## 2023-02-15 NOTE — BRIEF OPERATIVE NOTE - NSICDXBRIEFPOSTOP_GEN_ALL_CORE_FT
POST-OP DIAGNOSIS:  Osteoporotic compression fracture of spine 15-Feb-2023 10:42:29  Della Whittington  
POST-OP DIAGNOSIS:  Osteoporotic compression fracture of spine 15-Feb-2023 10:42:29  Della Whittington

## 2023-02-15 NOTE — ASU DISCHARGE PLAN (ADULT/PEDIATRIC) - CARE PROVIDER_API CALL
Yair Aguero (DO)  Orthopaedic Surgery  [address], [place] [id number]  Phone: [phone]  Fax: [phone]  Follow Up Time:

## 2023-02-15 NOTE — BRIEF OPERATIVE NOTE - NSICDXBRIEFPROCEDURE_GEN_ALL_CORE_FT
PROCEDURES:  Kyphoplasty, spine, thoracic, 1 level 15-Feb-2023 10:41:10  Della Whittington  
PROCEDURES:  Kyphoplasty, spine, thoracic, 1 level 15-Feb-2023 10:41:10  Della Whittington

## 2023-02-15 NOTE — ASU DISCHARGE PLAN (ADULT/PEDIATRIC) - DRIVING DURATION DAY(S)
do not drive within 8 hours of taking tramadol.  Otherwise, it is ok to drive as long as pain is controlled

## 2023-02-15 NOTE — BRIEF OPERATIVE NOTE - OPERATION/FINDINGS
Patient was maintained supine and conscious sedation was induced. I then assisted with positioning patient prone on a well-padded well-positioned flattop Alfred table draped the area and cleansed thoracic spine with chlorhexidine scrub after which time the RN scrubbed with DuraPrep. I assisted with marking the operative vertebrae using biplanar fluoroscopy. A participated in operative time out. We instilled 10 mL of 0.25 % marcaine with epinephrine as a local anesthetic over the patient's operative pedicle on the T12 vertebra   . Trocar was advanced to the pedicle, placed into the vertebral body and bone biopsy was obtained with my assistance. Balloon/cavity creation was produced. Cement was injected into the vertebral body under intermittent biplanar fluoroscopy guidance. Final fluoroscopic imaging was confirmed placement of kyphoplasty cement and the affected vertebral body.  I instilled 0.25% 10 cc of Marcaine, irrigated with normal saline, closed the incision with 3-0 Monocryl then Dermabond, Steri-Strips and a Mepilex topped with an OpSite.
T12 compression fracture sclerotic bone that required the Joy bone curette

## 2023-02-17 ENCOUNTER — NON-APPOINTMENT (OUTPATIENT)
Age: 68
End: 2023-02-17

## 2023-02-23 LAB — SURGICAL PATHOLOGY STUDY: SIGNIFICANT CHANGE UP

## 2023-03-03 ENCOUNTER — APPOINTMENT (OUTPATIENT)
Dept: ORTHOPEDIC SURGERY | Facility: CLINIC | Age: 68
End: 2023-03-03
Payer: MEDICARE

## 2023-03-03 PROBLEM — M48.50XA COLLAPSED VERTEBRA, NOT ELSEWHERE CLASSIFIED, SITE UNSPECIFIED, INITIAL ENCOUNTER FOR FRACTURE: Chronic | Status: ACTIVE | Noted: 2023-02-06

## 2023-03-03 PROBLEM — Z86.79 PERSONAL HISTORY OF OTHER DISEASES OF THE CIRCULATORY SYSTEM: Chronic | Status: ACTIVE | Noted: 2023-02-06

## 2023-03-03 PROBLEM — Z87.39 PERSONAL HISTORY OF OTHER DISEASES OF THE MUSCULOSKELETAL SYSTEM AND CONNECTIVE TISSUE: Chronic | Status: ACTIVE | Noted: 2023-02-06

## 2023-03-03 PROCEDURE — 72100 X-RAY EXAM L-S SPINE 2/3 VWS: CPT

## 2023-03-03 PROCEDURE — 99213 OFFICE O/P EST LOW 20 MIN: CPT

## 2023-03-03 NOTE — HISTORY OF PRESENT ILLNESS
[___ Weeks Post Op] : [unfilled] weeks post op [1] : the patient reports pain that is 1/10 in severity [Clean/Dry/Intact] : clean, dry and intact [Chills] : no chills [Xray (Date:___)] : [unfilled] Xray -  [de-identified] :   T12 kyphoplasty.\par DOS: 02/15/2023.  She is also status post lumbar instrumented fusion from the summer 2022 [de-identified] : Patient is approximately 2 weeks status post lumbar kyphoplasty.  Patient states she is essentially 100% cured with regard to back pain she is returned to essentially full activity at this point in time trip to [place] was uneventful [de-identified] : Incision for kyphoplasty is well-healed previous lumbar fusion incision is also well-healed [de-identified] : X-rays demonstrated previous lumbar fusion with approximate T12 kyphoplasty in place overall no cement extravasation. [de-identified] : Status post kyphoplasty [de-identified] : Guarded activity patient scheduled follow-up in approximately 2 months irregular close to approximate 10-month follow-up for her lumbar instrumented fusion in approximately 2-1/2-month follow-up for kyphoplasty

## 2023-04-11 ENCOUNTER — NON-APPOINTMENT (OUTPATIENT)
Age: 68
End: 2023-04-11

## 2023-04-11 DIAGNOSIS — Z87.891 PERSONAL HISTORY OF NICOTINE DEPENDENCE: ICD-10-CM

## 2023-04-11 DIAGNOSIS — Z83.3 FAMILY HISTORY OF DIABETES MELLITUS: ICD-10-CM

## 2023-04-11 DIAGNOSIS — Z87.09 PERSONAL HISTORY OF OTHER DISEASES OF THE RESPIRATORY SYSTEM: ICD-10-CM

## 2023-05-09 ENCOUNTER — APPOINTMENT (OUTPATIENT)
Dept: ORTHOPEDIC SURGERY | Facility: CLINIC | Age: 68
End: 2023-05-09
Payer: MEDICARE

## 2023-05-09 DIAGNOSIS — S32.000S WEDGE COMPRESSION FRACTURE OF UNSPECIFIED LUMBAR VERTEBRA, SEQUELA: ICD-10-CM

## 2023-05-09 PROCEDURE — 99024 POSTOP FOLLOW-UP VISIT: CPT

## 2023-05-09 PROCEDURE — 20552 NJX 1/MLT TRIGGER POINT 1/2: CPT

## 2023-05-09 NOTE — HISTORY OF PRESENT ILLNESS
[1] : the patient reports pain that is 1/10 in severity [Fever] : no fever [Clean/Dry/Intact] : clean, dry and intact [Healed] : healed [Erythema] : not erythematous [Discharge] : absent of discharge [Swelling] : not swollen [Dehiscence] : not dehisced [Neuro Intact] : an unremarkable neurological exam [Vascular Intact] : ~T peripheral vascular exam normal [Negative Norris's] : maneuvers demonstrated a negative Norris's sign [de-identified] : T12 kyphoplasty.\par DOS: 02/15/2023 [de-identified] : Trinh is approximately 3 months status post kyphoplasty states she is on 100% improved from her prekyphoplasty's date.  She presents mainly on today's date for a trigger point injection states that she has had a flareup of right lower lumbar pain right IT band syndrome she under the care of massage therapy overall doing very well with those modalities. [de-identified] : Physical exam is negative for acute wound complications however there is a very focal definitive right lower lumbar/paraspinal trigger point as well as mild IT band syndrome [de-identified] : That is post kyphoplasty as well as myositis/trigger point syndrome [de-identified] : Verbal consent was obtained and all questions, comments and concerns were addressed.  After right lower lumbar paraspinal trigger point in the affected area into superficial muscular trigger point was cleansed with alcohol prep X 3, ethyl chloride was used as local anesthetic.  Under sterile precautions 1cc of 1 percent lidocaine without epinephrine, plus 10 mg depomedrol, were instilled into the affected trigger points.  Patient tolerated procedure well. Dry sterile dressing was placed and patient described relief of pain 5 minutes after procedure was performed. \par Trigger point injection was provided in the right lower lumbar paraspinal musculature patient is can a follow-up in approximately 1 to 2 months for her year follow-up for a 3 level lumbar instrumented fusion

## 2023-05-23 ENCOUNTER — NON-APPOINTMENT (OUTPATIENT)
Age: 68
End: 2023-05-23

## 2023-05-23 ENCOUNTER — APPOINTMENT (OUTPATIENT)
Dept: CARDIOLOGY | Facility: CLINIC | Age: 68
End: 2023-05-23
Payer: MEDICARE

## 2023-05-23 VITALS
DIASTOLIC BLOOD PRESSURE: 76 MMHG | WEIGHT: 186.2 LBS | SYSTOLIC BLOOD PRESSURE: 124 MMHG | TEMPERATURE: 98.6 F | HEIGHT: 63 IN | OXYGEN SATURATION: 96 % | HEART RATE: 86 BPM | BODY MASS INDEX: 32.99 KG/M2

## 2023-05-23 DIAGNOSIS — R00.0 TACHYCARDIA, UNSPECIFIED: ICD-10-CM

## 2023-05-23 DIAGNOSIS — E78.5 HYPERLIPIDEMIA, UNSPECIFIED: ICD-10-CM

## 2023-05-23 DIAGNOSIS — I65.29 OCCLUSION AND STENOSIS OF UNSPECIFIED CAROTID ARTERY: ICD-10-CM

## 2023-05-23 DIAGNOSIS — E66.9 OBESITY, UNSPECIFIED: ICD-10-CM

## 2023-05-23 PROCEDURE — 93000 ELECTROCARDIOGRAM COMPLETE: CPT

## 2023-05-23 PROCEDURE — 99214 OFFICE O/P EST MOD 30 MIN: CPT

## 2023-05-23 RX ORDER — DULOXETINE HYDROCHLORIDE 30 MG/1
30 CAPSULE, DELAYED RELEASE PELLETS ORAL
Qty: 90 | Refills: 0 | Status: DISCONTINUED | COMMUNITY
Start: 2022-05-16 | End: 2023-05-23

## 2023-05-23 RX ORDER — LORATADINE 5 MG/5 ML
SOLUTION, ORAL ORAL
Refills: 0 | Status: DISCONTINUED | COMMUNITY
End: 2023-05-23

## 2023-05-23 RX ORDER — NAPROXEN SODIUM 220 MG/1
220 CAPSULE, LIQUID FILLED ORAL
Refills: 0 | Status: DISCONTINUED | COMMUNITY
End: 2023-05-23

## 2023-05-23 RX ORDER — METOPROLOL SUCCINATE 25 MG/1
25 TABLET, EXTENDED RELEASE ORAL DAILY
Qty: 90 | Refills: 3 | Status: DISCONTINUED | COMMUNITY
Start: 2023-01-09 | End: 2023-05-23

## 2023-05-23 RX ORDER — ABALOPARATIDE 2000 UG/ML
INJECTION, SOLUTION SUBCUTANEOUS
Refills: 0 | Status: DISCONTINUED | COMMUNITY
End: 2023-05-23

## 2023-05-23 NOTE — DISCUSSION/SUMMARY
[Patient] : the patient [Risks] : risks [Benefits] : benefits [Alternatives] : alternatives [FreeTextEntry1] : 67F h/o carotid artery sclerosis, HLD, polymyalgia rheumatica, chronic back pain with T-12 compression fracture, lumbar stenosis s/p spinal fusion 6/2022 initially seen for resting HR that can be up >100-110s since her back surgery, and minimal activities would cause her HR to increase, repeat limited Echocardiogram (preserved LV function) and Holter (SR with episodes of sinus tachycardia with HR  ranges from 72 to 157 bpm, average HR at least 100), since started on low dose Toprol with improvement in her resting HR; although still has episodes of fast HR at rest when she drinks wine (which she has avoided lately), last seen 3/2023 prior to repeat surgery, presents for follow up.\par \par Asymptomatic reactive sinus tachycardia likely due to deconditioning and reactive to stressors, advised continue cardio-exercise training as tolerated but given chronic back pain limited physical exercise, also need to inquire her PCP if recent TSH is done. Given no effect from low dose metoprolol use she prefer to monitor off medication. \par \par 1. Reactive sinus tachycardia- monitor off metoprolol succinate 25mg; continue cardio-exercise as tolerated; need recent TSH. \par \par 2. HLD- on low dose rosuvastatin. \par \par 3. Mild carotid atherosclerosis- outside study in 2022 reports <50% stenosis, repeat carotid Duplex\par \par 4. Obesity- need dieting and exercise. \par \par \par \par Follow up in 4 months.  [EKG obtained to assist in diagnosis and management of assessed problem(s)] : EKG obtained to assist in diagnosis and management of assessed problem(s)

## 2023-05-23 NOTE — CARDIOLOGY SUMMARY
[de-identified] : 2/6/2023: EKG from [facility] - Sinus rhythm, no ectopy, no acute st/t changes \par 5/23/23- Sinus 90, normal axis, borderline LVH by aVL criteria, QTc 416 [de-identified] : 12/14/2022: Limited - LVEF of 65-70%, trivial pericardial effusion\par \par outside TTE 4/2022: LV EF 63%, mild LVH, mild MR [de-identified] : outside carotid 5/2022 mild atherosclerosis

## 2023-05-23 NOTE — HISTORY OF PRESENT ILLNESS
[FreeTextEntry1] : 67F h/o carotid artery sclerosis, HLD, polymyalgia rheumatica, chronic back pain with T-12 compression fracture, lumbar stenosis s/p spinal fusion 6/2022 initially seen for resting HR that can be up >100-110s since her back surgery, and minimal activities would cause her HR to increase, repeat limited Echocardiogram (preserved LV function) and Holter (SR with episodes of sinus tachycardia with HR  ranges from 72 to 157 bpm, average HR at least 100), since started on low dose Toprol with improvement in her resting HR; although still has episodes of fast HR at rest when she drinks wine (which she has avoided lately), last seen 3/2023 prior to repeat surgery, presents for follow up.\par \par Reports feeling well, resting HR around 70s, denies palpitations, still with labile tachycardia to 110-120s with minimal activities with drinking and excitation, her FitBit will alert of highest HR 150s, doesn't see any difference in using metoprolol; only starting to do some aquatic exercise 2-3x per week. She had varicose veins ablation to her R-leg in March. She saw her PCP recently and had blood work done unclear if TSH was checked. \par \par \par \par Prior visit 3/2023: \par Now returns for preoperative cardiac risk assessment for upcoming Kyphoplasty to be performed by Dr.. Yair Aguero on 2/15/2023. She has chronic back pain that limits her activity tolerance and she can't exercise. She denies prior history of CAD/MI, DM, HTN, CHF, CVA/TIA. She denies chest pain and shortness of breath.\par \par Prior visit 11/2022:\par Reports has noticing her resting HR can be up >100-110s since her back surgery, and minimal activities would cause her HR to increase. Been bother by chronic back pain and had recent mechanical fall to her back. Was on prednisone for 7 months for PMR since discontinued, pending repeat . Denies chest pain or shortness of breath, walking 6-7K steps and cannot exercise due to back pain. \par \par TSH in 2.4 in 6/2022 \par \par She had outside carotid and Echo done few months ago. \par \par \par \par Mother with CAD/CABG age 70s\par Former smoker quit >40yrs, social alcohol

## 2023-06-13 ENCOUNTER — APPOINTMENT (OUTPATIENT)
Dept: ORTHOPEDIC SURGERY | Facility: CLINIC | Age: 68
End: 2023-06-13
Payer: MEDICARE

## 2023-06-13 VITALS
HEART RATE: 79 BPM | HEIGHT: 63 IN | SYSTOLIC BLOOD PRESSURE: 132 MMHG | WEIGHT: 185 LBS | TEMPERATURE: 98.1 F | BODY MASS INDEX: 32.78 KG/M2 | DIASTOLIC BLOOD PRESSURE: 84 MMHG

## 2023-06-13 PROCEDURE — 72100 X-RAY EXAM L-S SPINE 2/3 VWS: CPT

## 2023-06-13 PROCEDURE — 99213 OFFICE O/P EST LOW 20 MIN: CPT

## 2023-06-13 NOTE — PHYSICAL EXAM
[de-identified] : CONSTITUTIONAL: The patient is a very pleasant individual who is well-nourished and who appears stated age.\par PSYCHIATRIC: The patient is alert and oriented X 3 and in no apparent distress, and participates with orthopedic evaluation well.\par HEAD: Atraumatic and is nonsyndromic in appearance.\par EENT: No visible thyromegaly, EOMI.\par RESPIRATORY: Respiratory rate is regular, not dyspneic on examination.\par LYMPHATICS: There is no inguinal lymphadenopathy\par INTEGUMENTARY: Skin is clean, dry, and intact about the bilateral lower extremities and lumbar spine.\par VASCULAR: There is brisk capillary refill about the bilateral lower extremities.\par NEUROLOGIC: There are no pathologic reflexes. There is no decrease in sensation of the bilateral lower extremities on Wartenberg pinwheel examination. Deep tendon reflexes are well maintained at 2+/4 of the bilateral lower extremities and are symmetric..\par MUSCULOSKELETAL: There is no visible muscular atrophy. Manual motor strength is well maintained in the bilateral lower extremities. Range of motion of lumbar spine is well maintained. The patient ambulates in a non-myelopathic manner. Negative tension sign and straight leg raise bilaterally. Quad extension, ankle dorsiflexion, EHL, plantar flexion, and ankle eversion are well preserved. Normal secondary orthopaedic exam of bilateral hips, greater trochanteric area, knees and ankles [de-identified] : Review of today lumbar x-rays have been reviewed that shows multilevel lumbar instrumented fusion with interbody's as well as a T12 kyphoplasty overall stable and compared to previous x-rays.  Today's x-rays were performed on June 13, 2023 2 views lumbar spine

## 2023-06-13 NOTE — HISTORY OF PRESENT ILLNESS
[Improving] : improving [0] : a current pain level of 0/10 [1] : a maximum pain level of 1/10 [Intermit.] : ~He/She~ states the symptoms seem to be intermittent [Bending] : worsened by bending [Lifting] : worsened by lifting [Rest] : relieved by rest [de-identified] : Patient is approximately 1 year status post multilevel lumbar reconstruction.  Overall doing very well from that she has had great success with a kyphoplasty overall asymptomatic at this point in time is returned to activities of daily living. [Ataxia] : no ataxia [Incontinence] : no incontinence [Loss of Dexterity] : good dexterity [Urinary Ret.] : no urinary retention

## 2023-06-13 NOTE — DISCUSSION/SUMMARY
[de-identified] : Patient is 1 year status post multilevel lumbar fusion as well as a kyphoplasty overall doing very very well.  Patient has returned to all activities of daily life doing very well patient is can a follow-up in approximately 6 months.

## 2023-06-16 ENCOUNTER — APPOINTMENT (OUTPATIENT)
Dept: CARDIOLOGY | Facility: CLINIC | Age: 68
End: 2023-06-16
Payer: MEDICARE

## 2023-06-16 PROCEDURE — 93880 EXTRACRANIAL BILAT STUDY: CPT

## 2023-06-27 ENCOUNTER — NON-APPOINTMENT (OUTPATIENT)
Age: 68
End: 2023-06-27

## 2023-09-26 ENCOUNTER — APPOINTMENT (OUTPATIENT)
Dept: CARDIOLOGY | Facility: CLINIC | Age: 68
End: 2023-09-26

## 2023-10-20 ENCOUNTER — APPOINTMENT (OUTPATIENT)
Dept: ORTHOPEDIC SURGERY | Facility: CLINIC | Age: 68
End: 2023-10-20

## 2024-01-05 ENCOUNTER — APPOINTMENT (OUTPATIENT)
Dept: ORTHOPEDIC SURGERY | Facility: CLINIC | Age: 69
End: 2024-01-05
Payer: MEDICARE

## 2024-01-05 VITALS
DIASTOLIC BLOOD PRESSURE: 85 MMHG | HEIGHT: 63 IN | BODY MASS INDEX: 31.01 KG/M2 | HEART RATE: 102 BPM | SYSTOLIC BLOOD PRESSURE: 121 MMHG | WEIGHT: 175 LBS

## 2024-01-05 DIAGNOSIS — Z98.890 OTHER SPECIFIED POSTPROCEDURAL STATES: ICD-10-CM

## 2024-01-05 DIAGNOSIS — Z98.1 ARTHRODESIS STATUS: ICD-10-CM

## 2024-01-05 DIAGNOSIS — M60.9 MYOSITIS, UNSPECIFIED: ICD-10-CM

## 2024-01-05 PROCEDURE — 99214 OFFICE O/P EST MOD 30 MIN: CPT

## 2024-01-05 PROCEDURE — 72100 X-RAY EXAM L-S SPINE 2/3 VWS: CPT

## 2024-01-05 NOTE — PHYSICAL EXAM
[Antalgic] : antalgic [de-identified] : CONSTITUTIONAL: The patient is a very pleasant individual who is well-nourished and who appears stated age. PSYCHIATRIC: The patient is alert and oriented X 3 and in no apparent distress, and participates with orthopedic evaluation well. HEAD: Atraumatic and is nonsyndromic in appearance. EENT: No visible thyromegaly, EOMI. RESPIRATORY: Respiratory rate is regular, not dyspneic on examination. LYMPHATICS: There is no inguinal lymphadenopathy INTEGUMENTARY: Skin is clean, dry, and intact about the bilateral lower extremities and lumbar spine.  Lumbar incision is well-healed as well as carpal tunnel release incisions VASCULAR: There is brisk capillary refill about the bilateral lower extremities. NEUROLOGIC: There are no pathologic reflexes. There is no decrease in sensation of the bilateral lower extremities on manual  examination. Deep tendon reflexes are well maintained at 2+/4 of the bilateral lower extremities and are symmetric.. MUSCULOSKELETAL: There is no visible muscular atrophy. Manual motor strength is well maintained in the bilateral lower extremities. Range of motion of lumbar spine is well maintained. The patient ambulates in a non-myelopathic manner. Negative tension sign and straight leg raise bilaterally. Quad extension, ankle dorsiflexion, EHL, plantar flexion, and ankle eversion are well preserved. Normal secondary orthopaedic exam of bilateral hips, greater trochanteric area, knees and ankles, significant right gluteal and greater trochanteric bursitis as well as IT band syndrome.  As well as resolving carpal tunnel mild palmar scar tissue bilaterally. [de-identified] : X-rays been reviewed of the lumbar spine demonstrating a 3 level interbody fusion [id number].  As well as a T12 kyphoplasty.  These are unchanged when compared to June 2023 and 2 views lumbar spine ordered and reviewed on January 5, 2024.  MRI of the lumbar spine has been reviewed from [facility] dated November 2023 contains with and without contrast there is no evidence of central stenosis within the L2-L5 region mild central stenosis at T12-L1 also T12 kyphoplasty.  Otherwise essentially stable exam.

## 2024-01-05 NOTE — HISTORY OF PRESENT ILLNESS
[Improving] : improving [1] : a current pain level of 1/10 [4] : a maximum pain level of 4/10 [Intermit.] : ~He/She~ states the symptoms seem to be intermittent [Bending] : worsened by bending [Lifting] : worsened by lifting [Rest] : relieved by rest [de-identified] : Patient is approximately 2 years status post a multilevel lumbar instrumented fusion as well as approximately 1 year status post a T12 kyphoplasty.  Patient is overall doing very well she does have PMR which she feels may be aggravating some of her muscular skeletal complaints she has a very low level of back pain she more has a right gluteal tendinopathy IT band syndrome.  She had some recent carpal tunnel syndrome trigger finger release and she needs to go to OT for some scar tissue repair she had a new MRI by Dr. Diamond of the lumbar spine presents for further review and recommendations.  ELLY questionnaire is negative overall very pleased with the outcomes of her surgeries. [Ataxia] : no ataxia [Incontinence] : no incontinence [Loss of Dexterity] : good dexterity [Urinary Ret.] : no urinary retention

## 2024-01-05 NOTE — DISCUSSION/SUMMARY
[de-identified] : Overall Trinh is doing very very well she has very mild lower back pain appears to be the biggest issue right now is this persistent IT band and greater trochanteric bursitis type issue she is under pain management she has had injections for this she is in physical therapy possible consideration of PRP injections as well.  Carpal tunnel and trigger finger releases and Occupational Therapy should also continue patient will follow-up in approximately 1 year for repeat clinical assessment Endor sooner as needed.

## 2024-08-19 ENCOUNTER — NON-APPOINTMENT (OUTPATIENT)
Age: 69
End: 2024-08-19

## 2024-08-29 ENCOUNTER — APPOINTMENT (OUTPATIENT)
Dept: ORTHOPEDIC SURGERY | Facility: CLINIC | Age: 69
End: 2024-08-29
Payer: MEDICARE

## 2024-08-29 ENCOUNTER — NON-APPOINTMENT (OUTPATIENT)
Age: 69
End: 2024-08-29

## 2024-08-29 VITALS
DIASTOLIC BLOOD PRESSURE: 82 MMHG | SYSTOLIC BLOOD PRESSURE: 142 MMHG | BODY MASS INDEX: 30.12 KG/M2 | WEIGHT: 170 LBS | HEIGHT: 63 IN | HEART RATE: 77 BPM

## 2024-08-29 DIAGNOSIS — M67.951 UNSPECIFIED DISORDER OF SYNOVIUM AND TENDON, RIGHT THIGH: ICD-10-CM

## 2024-08-29 PROCEDURE — G2211 COMPLEX E/M VISIT ADD ON: CPT

## 2024-08-29 PROCEDURE — 73502 X-RAY EXAM HIP UNI 2-3 VIEWS: CPT

## 2024-08-29 PROCEDURE — 99214 OFFICE O/P EST MOD 30 MIN: CPT

## 2024-08-29 RX ORDER — MELOXICAM 7.5 MG/1
7.5 TABLET ORAL
Qty: 30 | Refills: 0 | Status: ACTIVE | COMMUNITY
Start: 2024-08-29 | End: 1900-01-01

## 2024-08-29 RX ORDER — OMEPRAZOLE 20 MG/1
20 TABLET, DELAYED RELEASE ORAL
Refills: 0 | Status: ACTIVE | COMMUNITY

## 2024-08-29 NOTE — PHYSICAL EXAM
[de-identified] :  The patient appears well nourished and in no apparent distress. The patient is alert and oriented to person, place, and time. Affect and mood appear normal. The head is normocephalic and atraumatic. The eyes reveal normal sclera and extra ocular muscles are intact. The tongue is midline with no apparent lesions. Skin shows normal turgor with no evidence of eczema or psoriasis. No respiratory distress noted. Sensation grossly intact. [de-identified] :  Exam of the right hip shows hip flexion of 120 degrees, hip external rotation of 70 degrees, hip internal rotation of 15 degrees. There is no pain with range of motion.  There is tenderness with gluteal tendon compression. 5/5 motor strength bilaterally distally. Sensation intact distally. [de-identified] : X-ray: AP of the pelvis and 2 views of the right hip demonstrate well preserved joint space

## 2024-08-29 NOTE — CONSULT LETTER
[Dear  ___] : Dear  [unfilled], [Consult Letter:] : I had the pleasure of evaluating your patient, [unfilled]. [Please see my note below.] : Please see my note below. [Consult Closing:] : Thank you very much for allowing me to participate in the care of this patient.  If you have any questions, please do not hesitate to contact me. [Sincerely,] : Sincerely, [FreeTextEntry2] : LEIGH ANN FERRARI [FreeTextEntry3] : Anand Stephen MD Chief of Joint Replacement Primary & Revision Hip and Knee Replacement [facility] [place]

## 2024-08-29 NOTE — HISTORY OF PRESENT ILLNESS
[de-identified] :  THI BRANCH is a 69 year old female who presents with right hip pain.  Patient localizes the pain mostly laterally and posterior laterally.  She denies any groin pain at this time.  She denies any falls or trauma.  Patient is status post lumbar fusion 2022 with Dr. Mast.  She has a history of spinal stenosis as seen on MRI in November 2023.  As for her hip she had seen pain management approximately 6 to 8 weeks ago and received a GT bursa injection.  She notes this worked temporarily.  She has not had an MRI of her hip thus far.  She has tried physical therapy and anti-inflammatories for more than 6 weeks without improvement.

## 2024-08-29 NOTE — DISCUSSION/SUMMARY
[de-identified] :  THI BRANCH is a 69 year old female who presents with a possible right hip gluteal tear. An MRI of the patient's right hip was ordered to evaluate for gluteal tear. The patient will follow up after imaging is completed to review the results in the office.  A course of Mobic was recommended. The patient was given a prescription for the Mobic with directions. She was instructed to stop the medicine and call the office if there are any adverse reaction to the medicine.

## 2024-09-12 ENCOUNTER — APPOINTMENT (OUTPATIENT)
Dept: ORTHOPEDIC SURGERY | Facility: CLINIC | Age: 69
End: 2024-09-12
Payer: MEDICARE

## 2024-09-12 VITALS — BODY MASS INDEX: 30.12 KG/M2 | HEIGHT: 63 IN | WEIGHT: 170 LBS

## 2024-09-12 PROCEDURE — 99213 OFFICE O/P EST LOW 20 MIN: CPT

## 2024-09-12 PROCEDURE — G2211 COMPLEX E/M VISIT ADD ON: CPT

## 2024-09-12 NOTE — PHYSICAL EXAM
[de-identified] :  Exam of the right hip shows hip flexion of 120 degrees, hip external rotation of 70 degrees, hip internal rotation of 15 degrees. There is no pain with range of motion.  There is tenderness with gluteal tendon compression. 5/5 motor strength bilaterally distally. Sensation intact distally. [de-identified] :  The patient appears well nourished and in no apparent distress. The patient is alert and oriented to person, place, and time. Affect and mood appear normal. The head is normocephalic and atraumatic. The eyes reveal normal sclera and extra ocular muscles are intact. The tongue is midline with no apparent lesions. Skin shows normal turgor with no evidence of eczema or psoriasis. No respiratory distress noted. Sensation grossly intact. [de-identified] : MRI done by [facility] on 9/3/2024  Impression and results from my independent review in office today and radiology report: Mild gluteus minimus and medius tendinosis, without tears. Partial tear at the origin of the semimembranosus tendon from the ischial tuberosity.

## 2024-09-12 NOTE — DISCUSSION/SUMMARY
[de-identified] :  THI BRANCH is a 69 year old female who presents with right hip gluteal tendinitis, and likely some component of pain radiating from her spine.  She was advised that she may benefit from physical therapy, but she declined as she has done it in the past and will continue doing her home exercises. She will continue with pain management trigger point injections.  She was advised to avoid repeated injections of the bursa with cortisone as they can lead to further gluteal tendon degeneration and tearing.  Her pain today also was not severe on palpation so a cortisone injection was not indicated.  She will follow up as needed.

## 2024-09-12 NOTE — HISTORY OF PRESENT ILLNESS
[de-identified] : THI BRANCH is a 69 year old female who presents with right hip pain. Patient localizes the pain mostly laterally and posterior laterally. She denies any groin pain at this time. She denies any falls or trauma. Patient is status post lumbar fusion 2022 with Dr. Mast. She has a history of spinal stenosis as seen on MRI in November 2023. As for her hip she had seen pain management approximately 6 to 8 weeks ago and received a GT bursa injection. She notes this worked temporarily. She has tried physical therapy and anti-inflammatories for more than 6 weeks without improvement. She presents today to review her MRI results.

## 2025-01-09 ENCOUNTER — NON-APPOINTMENT (OUTPATIENT)
Age: 70
End: 2025-01-09

## 2025-03-28 NOTE — BRIEF OPERATIVE NOTE - NSICDXBRIEFOPLAUNCH_GEN_ALL_CORE
LMOM for pt   
Pt sts she spoke with [facility] and before they will schedule her, they are wanting an MRI of the lumbar spine completed.  Pt is requesting for Dr Cheek to place an order for an MRI and she said she will schedule it at a [facility].    Pt sts she would like a call once the MRI order has been placed.  
<--- Click to Launch ICDx for PreOp, PostOp and Procedure
<--- Click to Launch ICDx for PreOp, PostOp and Procedure

## 2025-04-04 ENCOUNTER — APPOINTMENT (OUTPATIENT)
Dept: ORTHOPEDIC SURGERY | Facility: CLINIC | Age: 70
End: 2025-04-04
Payer: MEDICARE

## 2025-04-04 VITALS
BODY MASS INDEX: 30.12 KG/M2 | WEIGHT: 170 LBS | HEART RATE: 77 BPM | SYSTOLIC BLOOD PRESSURE: 133 MMHG | HEIGHT: 63 IN | DIASTOLIC BLOOD PRESSURE: 80 MMHG

## 2025-04-04 DIAGNOSIS — Z98.1 ARTHRODESIS STATUS: ICD-10-CM

## 2025-04-04 DIAGNOSIS — Z98.890 OTHER SPECIFIED POSTPROCEDURAL STATES: ICD-10-CM

## 2025-04-04 PROCEDURE — 99214 OFFICE O/P EST MOD 30 MIN: CPT

## 2025-04-04 PROCEDURE — 72100 X-RAY EXAM L-S SPINE 2/3 VWS: CPT

## 2025-06-20 NOTE — H&P PST ADULT - ENMT
Emma Henriquez is a 61 year old female presenting for   Chief Complaint   Patient presents with    Office Visit    Pre-Op Exam       Depression Screening:  Review Flowsheet  More data exists         10/29/2024   PHQ 2/9 Score   Adult PHQ 2 Score 0   Adult PHQ 2 Interpretation No further screening needed   Little interest or pleasure in activity? Not at all   Feeling down, depressed or hopeless? Not at all        Denies Latex allergy or sensitivity.    Medication verified and med list updated  Refills needed today: No    Patient would like communication of their results via:   Cell Phone:   Telephone Information:   Mobile [phone]     Okay to leave a message containing results? Yes        Health Maintenance       COVID-19 Vaccine (4 - 2024-25 season)  Overdue since 9/1/2024    Diabetes Eye Exam (Yearly)  Overdue since 11/18/2024    DTaP/Tdap/Td Vaccine (1 - Tdap)  Never done    Shingles Vaccine (1 of 2)  Never done    Diabetes A1C or GMI (Every 6 Months)  Due soon on 8/19/2025    Hepatitis B Vaccine (1 of 3 - 19+ 3-dose series)  Postponed until 5/19/2026    Pneumococcal Vaccine 50+ (2 of 2 - PCV)  Postponed until 5/19/2026           Following review of the above:  Patient is not proceeding with: Diabetes Eye Exam, COVID-19, Dtap/Tdap/Td, Hep B, Pneumococcal, and Shingles    Note: Refer to final orders and clinician documentation.           Addressed the Advanced Care Planning (Advance Directives):  Yes     negative

## (undated) DEVICE — NDL ACCESS 11G

## (undated) DEVICE — SOL IRR POUR H2O 1000ML

## (undated) DEVICE — SYR CONTROL LUER LOK 10CC

## (undated) DEVICE — FRAZIER CONNECTING TUBE 2FT 5MM

## (undated) DEVICE — SUT MONOCRYL 3-0 27" PS-2 UNDYED

## (undated) DEVICE — DRAPE TOWEL 1000 SMALL 17" X 11"

## (undated) DEVICE — DRSG STERISTRIPS 0.5 X 4"

## (undated) DEVICE — WARMING BLANKET UPPER ADULT

## (undated) DEVICE — STRYKER IVAS CURETTE 11G

## (undated) DEVICE — POSITIONER JACKSON TABLE PRONEVIEW CUSHION, CHEST, HIP, THIGH PADS

## (undated) DEVICE — MARKING PEN W RULER

## (undated) DEVICE — SUT VICRYL 3-0 27" PS-2 UNDYED

## (undated) DEVICE — TIP BALL DIRECT DISP

## (undated) DEVICE — DRSG DERMABOND 0.7ML

## (undated) DEVICE — DRAPE C ARM UNIVERSAL

## (undated) DEVICE — GLV 6.5 PROTEXIS (WHITE)

## (undated) DEVICE — DRAPE 1/2 SHEET 40X57"

## (undated) DEVICE — DRAPE LARGE SHEET 72X85"

## (undated) DEVICE — GLV 6.5 PROTEXIS (BLUE)

## (undated) DEVICE — DRAPE TOWEL BLUE 17" X 24"

## (undated) DEVICE — WARMING BLANKET LOWER ADULT

## (undated) DEVICE — STRYKER IVAS BONE BIOPSY KIT 11G

## (undated) DEVICE — GLV 8 PROTEXIS (BLUE)

## (undated) DEVICE — GLV 8 PROTEXIS (WHITE)

## (undated) DEVICE — GLV 7 PROTEXIS (WHITE)

## (undated) DEVICE — VENODYNE/SCD SLEEVE CALF MEDIUM

## (undated) DEVICE — PACK MINOR WITH LAP

## (undated) DEVICE — SOL IRR POUR NS 0.9% 1000ML

## (undated) DEVICE — DRAPE SPLIT SHEET 77" X 108"